# Patient Record
Sex: FEMALE | Race: WHITE | NOT HISPANIC OR LATINO | Employment: UNEMPLOYED | URBAN - METROPOLITAN AREA
[De-identification: names, ages, dates, MRNs, and addresses within clinical notes are randomized per-mention and may not be internally consistent; named-entity substitution may affect disease eponyms.]

---

## 2018-10-16 ENCOUNTER — CONVERSION ENCOUNTER (OUTPATIENT)
Dept: GENERAL RADIOLOGY | Facility: HOSPITAL | Age: 43
End: 2018-10-16

## 2019-09-22 ENCOUNTER — HOSPITAL ENCOUNTER (OUTPATIENT)
Dept: URGENT CARE | Facility: CLINIC | Age: 44
Discharge: HOME | End: 2019-09-22

## 2019-11-08 ENCOUNTER — HOSPITAL ENCOUNTER (OUTPATIENT)
Dept: SLEEP MEDICINE | Facility: HOSPITAL | Age: 44
Discharge: HOME | End: 2019-11-08
Attending: INTERNAL MEDICINE

## 2019-12-19 ENCOUNTER — HOSPITAL ENCOUNTER (OUTPATIENT)
Dept: SLEEP MEDICINE | Facility: HOSPITAL | Age: 44
Discharge: HOME | End: 2019-12-19
Attending: INTERNAL MEDICINE

## 2020-03-06 ENCOUNTER — HOSPITAL ENCOUNTER (OUTPATIENT)
Dept: SLEEP MEDICINE | Facility: HOSPITAL | Age: 45
Discharge: HOME | End: 2020-03-06
Attending: INTERNAL MEDICINE

## 2020-03-11 ENCOUNTER — OFFICE VISIT CONVERTED (OUTPATIENT)
Dept: NEUROSURGERY | Facility: CLINIC | Age: 45
End: 2020-03-11
Attending: NEUROLOGICAL SURGERY

## 2021-05-15 VITALS
DIASTOLIC BLOOD PRESSURE: 82 MMHG | SYSTOLIC BLOOD PRESSURE: 124 MMHG | BODY MASS INDEX: 33.95 KG/M2 | HEIGHT: 62 IN | WEIGHT: 184.5 LBS

## 2021-05-17 ENCOUNTER — HOSPITAL ENCOUNTER (OUTPATIENT)
Dept: URGENT CARE | Facility: CLINIC | Age: 46
Discharge: HOME | End: 2021-05-17
Attending: FAMILY MEDICINE

## 2021-07-30 RX ORDER — VARENICLINE TARTRATE 0.5 MG/1
TABLET, FILM COATED ORAL
COMMUNITY
End: 2021-11-03

## 2021-07-30 RX ORDER — SUMATRIPTAN 25 MG/1
TABLET, FILM COATED ORAL
COMMUNITY
End: 2021-11-03

## 2021-07-30 RX ORDER — IBUPROFEN 800 MG/1
TABLET ORAL
COMMUNITY
End: 2022-03-24

## 2021-08-02 ENCOUNTER — OFFICE VISIT (OUTPATIENT)
Dept: SURGERY | Facility: CLINIC | Age: 46
End: 2021-08-02

## 2021-08-02 VITALS — WEIGHT: 181.6 LBS | RESPIRATION RATE: 14 BRPM | HEIGHT: 62 IN | BODY MASS INDEX: 33.42 KG/M2

## 2021-08-02 DIAGNOSIS — D17.1 LIPOMA OF BACK: Primary | ICD-10-CM

## 2021-08-02 PROCEDURE — 99202 OFFICE O/P NEW SF 15 MIN: CPT | Performed by: SURGERY

## 2021-08-02 RX ORDER — CYCLOBENZAPRINE HCL 10 MG
10 TABLET ORAL 2 TIMES DAILY
COMMUNITY
Start: 2021-05-20 | End: 2022-03-24

## 2021-08-02 RX ORDER — TRAMADOL HYDROCHLORIDE 50 MG/1
TABLET ORAL
COMMUNITY
Start: 2021-07-27 | End: 2021-11-03

## 2021-08-02 RX ORDER — BENZONATATE 100 MG/1
CAPSULE ORAL
COMMUNITY
Start: 2021-06-18 | End: 2022-03-24

## 2021-08-02 RX ORDER — SUMATRIPTAN 100 MG/1
TABLET, FILM COATED ORAL
COMMUNITY
Start: 2021-07-06

## 2021-08-02 RX ORDER — FLUTICASONE PROPIONATE 50 MCG
1 SPRAY, SUSPENSION (ML) NASAL DAILY
COMMUNITY
Start: 2021-07-21 | End: 2022-03-24

## 2021-08-02 RX ORDER — DEXAMETHASONE 6 MG/1
6 TABLET ORAL DAILY
COMMUNITY
Start: 2021-05-17 | End: 2022-03-24

## 2021-08-02 RX ORDER — METHOCARBAMOL 750 MG/1
750 TABLET, FILM COATED ORAL EVERY 8 HOURS PRN
COMMUNITY
Start: 2021-07-26 | End: 2022-03-24

## 2021-08-02 RX ORDER — PROMETHAZINE HYDROCHLORIDE 25 MG/1
25 TABLET ORAL EVERY 6 HOURS PRN
COMMUNITY
Start: 2021-05-17 | End: 2022-10-26

## 2021-08-02 NOTE — PROGRESS NOTES
Inpatient History and Physical Surgical Orders    Preadmission Location:   Preadmission Time:  Facility:  Surgery Date:  Surgery Time:  Preadmission Test date:     Chief Complaint  Outpatient History and Physical / Surgical Orders    Primary Care Provider: Janet Cooley APRN    Referring Provider: Janet Cooley,*    Subjective      Patient Name: Davina Abarca : 1975    HPI  Davina came in today with a subcutaneous mass of the lower back.  Has a lot of trouble with chronic back pain and has known lordosis.  She recently saw her primary care provider who felt a small subcutaneous nodule in her right lower back.  She had an ultrasound done that showed no definite mass there.    Past History:  Medical History: has a past medical history of Leg pain, Lumbago, Migraines, and Thoracic back pain.   Surgical History: has a past surgical history that includes Tubal ligation.   Family History: family history includes Arthritis in her mother; Stroke in her father.   Social History: reports that she has been smoking. She has never used smokeless tobacco. She reports current alcohol use.  Allergies: Patient has no known allergies.       Current Outpatient Medications:   •  diclofenac (VOLTAREN) 50 MG EC tablet, TAKE 1 TABLET BY MOUTH EVERY 8 HOURS WITH FOOD OR MILK AS NEEDED, Disp: , Rfl:   •  fluticasone (FLONASE) 50 MCG/ACT nasal spray, 1 spray by Each Nare route Daily., Disp: , Rfl:   •  Melatonin 5 MG capsule, melatonin 5 mg oral capsule take 1 capsule by oral route As needed   Active, Disp: , Rfl:   •  methocarbamol (ROBAXIN) 750 MG tablet, Take 750 mg by mouth Every 8 (Eight) Hours As Needed., Disp: , Rfl:   •  SUMAtriptan (IMITREX) 100 MG tablet, TAKE 1 TABLET BY MOUTH 1 TIME DAILY AS NEEDED, Disp: , Rfl:   •  traMADol (ULTRAM) 50 MG tablet, TAKE 1 TABLET BY MOUTH EVERY 8 HOURS FOR 14 DAYS AS NEEDED, Disp: , Rfl:   •  benzonatate (TESSALON) 100 MG capsule, TAKE 1 CAPSULE BY MOUTH THREE TIMES  "DAILY FOR 10 DAYS AS NEEDED, Disp: , Rfl:   •  cyclobenzaprine (FLEXERIL) 10 MG tablet, Take 10 mg by mouth 2 (Two) Times a Day., Disp: , Rfl:   •  dexamethasone (DECADRON) 6 MG tablet, Take 6 mg by mouth Daily., Disp: , Rfl:   •  ibuprofen (ADVIL,MOTRIN) 800 MG tablet, ibuprofen 800 mg oral tablet take 1 tablet (800 mg) by oral route 3 times per day with food   Active, Disp: , Rfl:   •  promethazine (PHENERGAN) 25 MG tablet, Take 25 mg by mouth Every 6 (Six) Hours As Needed., Disp: , Rfl:   •  SUMAtriptan (IMITREX) 25 MG tablet, sumatriptan succinate 25 mg oral tablet take 1 tablet (25 mg) by oral route once with fluids as early as possible after the onset of a migraine attack;may repeat after 2 hours if headache returns, not to exceed 200mg in 24hrs   Active, Disp: , Rfl:   •  varenicline (Chantix) 0.5 MG tablet, Chantix 0.5 mg oral tablet take 1 tablet (0.5 mg) with glass of water by oral route 2 times per day after meals for 3 days   Active, Disp: , Rfl:        Objective   Vital Signs:   Resp 14   Ht 156.8 cm (61.75\")   Wt 82.4 kg (181 lb 9.6 oz)   BMI 33.48 kg/m²       Physical Exam  Vitals and nursing note reviewed.   Constitutional:       Appearance: Normal appearance.  She is well-developed.   Cardiovascular:      Rate and Rhythm: Normal rate and regular rhythm.   Pulmonary:      Effort: Pulmonary effort is normal.      Breath sounds: Normal air entry.   Abdominal:      General: Bowel sounds are normal.      Palpations: Abdomen is soft.      Skin:     General: Skin is warm and dry.   Neurological:      Mental Status: He is alert and oriented to person, place, and time.      Motor: Motor function is intact.   Psychiatric:         Mood and Affect: Mood normal.   Back: 2 cm subcutaneous lipoma of the right lower back noted    Result Review :               Assessment and Plan   Diagnoses and all orders for this visit:    1. Lipoma of back (Primary)    I have talked the matter over with Davina and at this " point I think it would be fine just to follow this lesion.  I do think that this is not large enough to really be producing a lot of pain and she was fine just following this expectantly.  I will see her back on an as-needed basis.    I  Heber Chand MD  08/02/2021

## 2021-08-04 ENCOUNTER — TRANSCRIBE ORDERS (OUTPATIENT)
Dept: ADMINISTRATIVE | Facility: HOSPITAL | Age: 46
End: 2021-08-04

## 2021-08-04 DIAGNOSIS — M50.90 DISC DISORDER OF CERVICAL REGION: Primary | ICD-10-CM

## 2021-08-25 ENCOUNTER — APPOINTMENT (OUTPATIENT)
Dept: NEUROLOGY | Facility: HOSPITAL | Age: 46
End: 2021-08-25

## 2021-09-14 ENCOUNTER — PROCEDURE VISIT (OUTPATIENT)
Dept: NEUROLOGY | Facility: CLINIC | Age: 46
End: 2021-09-14

## 2021-09-14 VITALS
SYSTOLIC BLOOD PRESSURE: 137 MMHG | DIASTOLIC BLOOD PRESSURE: 73 MMHG | BODY MASS INDEX: 33.68 KG/M2 | HEIGHT: 62 IN | WEIGHT: 183 LBS | HEART RATE: 63 BPM

## 2021-09-14 DIAGNOSIS — G56.01 CARPAL TUNNEL SYNDROME OF RIGHT WRIST: Primary | ICD-10-CM

## 2021-09-14 DIAGNOSIS — M50.90 DISC DISORDER OF CERVICAL REGION: ICD-10-CM

## 2021-09-14 PROBLEM — R20.0 NUMBNESS AND TINGLING: Status: ACTIVE | Noted: 2021-09-14

## 2021-09-14 PROBLEM — R20.2 NUMBNESS AND TINGLING: Status: ACTIVE | Noted: 2021-09-14

## 2021-09-14 PROCEDURE — 99202 OFFICE O/P NEW SF 15 MIN: CPT | Performed by: PSYCHIATRY & NEUROLOGY

## 2021-09-14 PROCEDURE — 95886 MUSC TEST DONE W/N TEST COMP: CPT | Performed by: PSYCHIATRY & NEUROLOGY

## 2021-09-14 PROCEDURE — 95909 NRV CNDJ TST 5-6 STUDIES: CPT | Performed by: PSYCHIATRY & NEUROLOGY

## 2021-09-14 NOTE — ASSESSMENT & PLAN NOTE
Nerve conduction study is abnormal and shows electrophysiologic evidence for mild right carpal tunnel syndrome.  I have given her a prescription to get wrist splints and she is to wear it for the next 4 weeks.  If there is improvement of her symptoms she can cancel neurosurgical appointment.  Discussed with her the wrist plants is a treatment for carpal tunnel syndrome.  If it is not effective she can go to orthopedic surgery to get steroid injections to her wrist.

## 2021-09-14 NOTE — ASSESSMENT & PLAN NOTE
EMG is normal and does not show electrophysiologic evidence for cervical radiculopathy involving the C5-T1 ventral nerve roots on the right side.  There is no evidence of denervation or reinnervation in the muscles tested.  Clinically I do not think she has a cervical radiculopathy.  She does have neuroforaminal narrowing at C5-C6 on the right on her previous MRI.  If there is no improvement of her right arm numbness and tingling symptomatology  she can follow-up with neurosurgery after she sees orthopedics.
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
## 2021-10-07 ENCOUNTER — OFFICE VISIT (OUTPATIENT)
Dept: NEUROSURGERY | Facility: CLINIC | Age: 46
End: 2021-10-07

## 2021-10-07 VITALS
BODY MASS INDEX: 33.16 KG/M2 | HEIGHT: 62 IN | SYSTOLIC BLOOD PRESSURE: 137 MMHG | WEIGHT: 180.2 LBS | DIASTOLIC BLOOD PRESSURE: 89 MMHG

## 2021-10-07 DIAGNOSIS — M54.12 CERVICAL RADICULOPATHY: ICD-10-CM

## 2021-10-07 DIAGNOSIS — M54.2 CERVICALGIA: Primary | ICD-10-CM

## 2021-10-07 DIAGNOSIS — G56.01 CARPAL TUNNEL SYNDROME OF RIGHT WRIST: ICD-10-CM

## 2021-10-07 PROCEDURE — 99203 OFFICE O/P NEW LOW 30 MIN: CPT | Performed by: NEUROLOGICAL SURGERY
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
## 2021-10-15 ENCOUNTER — HOSPITAL ENCOUNTER (OUTPATIENT)
Dept: MRI IMAGING | Facility: HOSPITAL | Age: 46
Discharge: HOME | End: 2021-10-15
Admitting: NEUROLOGICAL SURGERY

## 2021-10-15 DIAGNOSIS — M54.12 CERVICAL RADICULOPATHY: ICD-10-CM

## 2021-10-15 PROCEDURE — 72141 MRI NECK SPINE W/O DYE: CPT

## 2021-11-18 ENCOUNTER — OFFICE VISIT (OUTPATIENT)
Dept: NEUROSURGERY | Facility: CLINIC | Age: 46
End: 2021-11-18

## 2021-11-18 VITALS
SYSTOLIC BLOOD PRESSURE: 132 MMHG | DIASTOLIC BLOOD PRESSURE: 84 MMHG | WEIGHT: 181.3 LBS | HEIGHT: 62 IN | BODY MASS INDEX: 33.36 KG/M2

## 2021-11-18 DIAGNOSIS — M54.2 CERVICALGIA: Primary | ICD-10-CM

## 2021-11-18 DIAGNOSIS — G56.01 CARPAL TUNNEL SYNDROME OF RIGHT WRIST: ICD-10-CM

## 2021-11-18 PROCEDURE — 99213 OFFICE O/P EST LOW 20 MIN: CPT | Performed by: NEUROLOGICAL SURGERY

## 2021-11-18 NOTE — PROGRESS NOTES
Davina Abarca is a 46 y.o. female that presents with Neck Pain       She has continued numbness into the right greater than left. Her MRI shows multilevel bulges with no significant spinal stenosis. Her EMG/NCV shows mild right carpal tunnel syndrome.       Review of Systems   Musculoskeletal: Positive for myalgias, neck pain and neck stiffness.        Vitals:    11/18/21 1544   BP: 132/84        I personally reviewed the patient's MRI scan which shows mild multilevel degenerative changes. No significant canal or foraminal stenosis.        Assessment and Plan {CC Problem List  Visit Diagnosis  ROS  Review (Popup)  [facility] [place]  Quality  BestPractice  Medications  SmartSets  SnapShot Encounters  Media :23}   Problem List Items Addressed This Visit        Neuro    Carpal tunnel syndrome of right wrist      Other Visit Diagnoses     Cervicalgia    -  Primary      She will monitor for worsened pain into the arm, and specific dermatomes.     She might benefit from a cervical over the door traction device (10-12 lbs for 10-15 minutes).     We discussed the importance of smoking/nicotine cessation. Smoking/nicotine use has multiple health risks. In particular related to the spine, nicotine increases the incidence of lower back pain, speeds up the progression of degenerative disc disease and dramatically reduces healing after spine surgery (particularly a fusion operation).     Follow Up {Instructions Charge Capture  Follow-up Communications :23}   No follow-ups on file.

## 2022-01-04 ENCOUNTER — PREP FOR SURGERY (OUTPATIENT)
Dept: OTHER | Facility: HOSPITAL | Age: 47
End: 2022-01-04

## 2022-01-04 ENCOUNTER — CLINICAL SUPPORT (OUTPATIENT)
Dept: GASTROENTEROLOGY | Facility: CLINIC | Age: 47
End: 2022-01-04

## 2022-01-04 ENCOUNTER — TELEPHONE (OUTPATIENT)
Dept: GASTROENTEROLOGY | Facility: CLINIC | Age: 47
End: 2022-01-04

## 2022-01-04 DIAGNOSIS — Z12.11 COLON CANCER SCREENING: Primary | ICD-10-CM

## 2022-01-04 RX ORDER — ORPHENADRINE CITRATE 100 MG/1
TABLET, EXTENDED RELEASE ORAL
COMMUNITY
End: 2022-12-13

## 2022-01-04 NOTE — TELEPHONE ENCOUNTER
Davina Abarca  REASON FOR CALL encounter for colon screening  SENT IN PREP suprep  Past Medical History:   Diagnosis Date   • Leg pain    • Lumbago    • Migraines    • Thoracic back pain      No Known Allergies  Past Surgical History:   Procedure Laterality Date   • TUBAL ABDOMINAL LIGATION       Social History     Socioeconomic History   • Marital status:    Tobacco Use   • Smoking status: Current Every Day Smoker   • Smokeless tobacco: Never Used   Vaping Use   • Vaping Use: Never used   Substance and Sexual Activity   • Alcohol use: Yes     Comment: current some day occasionally drinks, less than 1 drink perday has been drinking for 6-10 years   • Drug use: Defer   • Sexual activity: Defer     Family History   Problem Relation Age of Onset   • Arthritis Mother    • Stroke Father        Current Outpatient Medications:   •  amoxicillin-clavulanate (AUGMENTIN) 875-125 MG per tablet, Take 1 tablet by mouth 2 (Two) Times a Day., Disp: 20 tablet, Rfl: 0  •  benzonatate (TESSALON) 100 MG capsule, TAKE 1 CAPSULE BY MOUTH THREE TIMES DAILY FOR 10 DAYS AS NEEDED, Disp: , Rfl:   •  cyclobenzaprine (FLEXERIL) 10 MG tablet, Take 10 mg by mouth 2 (Two) Times a Day., Disp: , Rfl:   •  dexamethasone (DECADRON) 6 MG tablet, Take 6 mg by mouth Daily., Disp: , Rfl:   •  diclofenac (VOLTAREN) 50 MG EC tablet, TAKE 1 TABLET BY MOUTH EVERY 8 HOURS WITH FOOD OR MILK AS NEEDED, Disp: , Rfl:   •  fluticasone (FLONASE) 50 MCG/ACT nasal spray, 1 spray by Each Nare route Daily., Disp: , Rfl:   •  ibuprofen (ADVIL,MOTRIN) 800 MG tablet, ibuprofen 800 mg oral tablet take 1 tablet (800 mg) by oral route 3 times per day with food   Active, Disp: , Rfl:   •  Melatonin 5 MG capsule, melatonin 5 mg oral capsule take 1 capsule by oral route As needed   Active, Disp: , Rfl:   •  methocarbamol (ROBAXIN) 750 MG tablet, Take 750 mg by mouth Every 8 (Eight) Hours As Needed., Disp: , Rfl:   •  orphenadrine (NORFLEX) 100 MG 12 hr tablet,  orphenadrine citrate  mg tablet,extended release  TAKE 1 TABLET BY MOUTH DAILY AT BEDTIME, Disp: , Rfl:   •  promethazine (PHENERGAN) 25 MG tablet, Take 25 mg by mouth Every 6 (Six) Hours As Needed., Disp: , Rfl:   •  SUMAtriptan (IMITREX) 100 MG tablet, TAKE 1 TABLET BY MOUTH 1 TIME DAILY AS NEEDED, Disp: , Rfl:

## 2022-01-04 NOTE — PROGRESS NOTES
SPOKE WITH PT ON A DATE FOR COLONOSCOPY OF  3/29/22. MADE SURE CHART WAS UP TO DATE. WENT OVER PREP AND MAILED OUT INSTRUCTIONS. PUT IN ORDER FOR COLON AND SENT PREP TO PHARMACY

## 2022-02-02 ENCOUNTER — TELEPHONE (OUTPATIENT)
Dept: PHYSICAL THERAPY | Facility: CLINIC | Age: 47
End: 2022-02-02

## 2022-03-22 ENCOUNTER — TELEPHONE (OUTPATIENT)
Dept: GASTROENTEROLOGY | Facility: CLINIC | Age: 47
End: 2022-03-22

## 2022-03-22 NOTE — TELEPHONE ENCOUNTER
Attempted to contact pt in regards to procedure reminder. Patient did not answer/ vm was full.   Mychart reminder also sent.

## 2022-03-29 NOTE — TELEPHONE ENCOUNTER
Patient r/s colonoscopy for 07/21/2022 with an arrival time of 0900. Patient aware of date and time.

## 2022-04-13 ENCOUNTER — TELEPHONE (OUTPATIENT)
Dept: NEUROLOGY | Facility: CLINIC | Age: 47
End: 2022-04-13

## 2022-04-13 NOTE — TELEPHONE ENCOUNTER
Caller: Davina Abarca    Relationship: Self    Best call back number: [phone]    What was the call regarding:   MEDICAL RECORDS BE FAXED TO HER   [facility] [facility]  DUARTE OR JACEY SAWANT WOULD HAVE BEEN THE ONES TO MAKE THE REQUEST.    GAVE PT [facility] PHONE AND FAX NUMBER    PT STATES REQUEST WAS MADE BACK IN FEB AND ON MAR 8, 2022 AND HER  STILL HASN'T RECEIVED THOSE.    CALLED [facility] TO GET INFO FOR PT. WARM TRANSFERRED PT TO [facility].

## 2022-04-26 ENCOUNTER — TELEPHONE (OUTPATIENT)
Dept: NEUROSURGERY | Facility: CLINIC | Age: 47
End: 2022-04-26
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
## 2022-07-07 ENCOUNTER — TELEPHONE (OUTPATIENT)
Dept: GASTROENTEROLOGY | Facility: CLINIC | Age: 47
End: 2022-07-07

## 2022-07-07 ENCOUNTER — OFFICE VISIT (OUTPATIENT)
Dept: OBSTETRICS AND GYNECOLOGY | Facility: CLINIC | Age: 47
End: 2022-07-07

## 2022-07-07 VITALS
HEART RATE: 83 BPM | WEIGHT: 154 LBS | BODY MASS INDEX: 28.17 KG/M2 | SYSTOLIC BLOOD PRESSURE: 147 MMHG | DIASTOLIC BLOOD PRESSURE: 80 MMHG

## 2022-07-07 DIAGNOSIS — Z01.419 ENCOUNTER FOR GYNECOLOGICAL EXAMINATION WITHOUT ABNORMAL FINDING: Primary | ICD-10-CM

## 2022-07-07 DIAGNOSIS — N93.0 BLEEDING AFTER INTERCOURSE: ICD-10-CM

## 2022-07-07 PROCEDURE — 99396 PREV VISIT EST AGE 40-64: CPT | Performed by: OBSTETRICS & GYNECOLOGY

## 2022-07-07 PROCEDURE — G0123 SCREEN CERV/VAG THIN LAYER: HCPCS | Performed by: OBSTETRICS & GYNECOLOGY

## 2022-07-07 PROCEDURE — 99212 OFFICE O/P EST SF 10 MIN: CPT | Performed by: OBSTETRICS & GYNECOLOGY

## 2022-07-07 NOTE — PROGRESS NOTES
Well Woman Visit    CC: Annual well woman exam       HPI:   46 y.o.No obstetric history on file. Contraception or HRT: Contraception:  Tubal ligation  Menses:   q mon, lasts 3-5 days, changes products q 4hrs on heaviest days.   Pain:  Moderate, not too bad  Incontinence concerns: No  Hx of abnormal pap:  No  Pt has concerns she would like to discuss. has bled during intercourse for years. Does not always happen and it is only spotting. Denies vaginal d/c, itching, or odor. No new sex partners. Some positions make it worse. Denies pain during or after intercourse.       History: PMHx, Meds, Allergies, PSHx, Social Hx, and POBHx all reviewed and updated.      PHYSICAL EXAM:  /80   Pulse 83   Wt 69.9 kg (154 lb)   LMP 06/16/2022 (Approximate)   BMI 28.17 kg/m²   General- NAD, alert and oriented, appropriate  Psych- Normal mood, good memory  Neck- No masses, no thyroid enlargement  CV- Regular rhythm, no murnurs  Resp- CTA to bases, no wheezes  Abdomen- Soft, non distended, non tender, no masses    Breast left-  Bilaterally symmetrical, no masses, non tender, no nipple discharge  Breast right- Bilaterally symmetrical, no masses, non tender, no nipple discharge    External genitalia- Normal female, no lesions  Urethra/meatus- Normal, no masses, non tender, no prolapse  Bladder- Normal, no masses, non tender, no prolapse  Vagina- Normal, no atrophy, no lesions, no discharge, no prolapse  Cvx- Normal, no lesions, no discharge, No cervical motion tenderness, scant bleeding noted with pap collection  Uterus- Normal size, shape & consistency.  Non tender, mobile, & no prolapse  Adnexa- No mass, non tender  Anus/Rectum/Perineum- Declined    Lymphatic- No palpable neck, axillary, or groin nodes  Ext- No edema, no cyanosis    Skin- No lesions, no rashes, no acanthosis nigricans        ASSESSMENT and PLAN:  WWE and Problem Visit    Diagnoses and all orders for this visit:    1. Encounter for gynecological examination  without abnormal finding (Primary)  -     Mammo Screening Digital Tomosynthesis Bilateral With CAD; Future  -     IGP,rfx Aptima HPV All Pth    2. Bleeding after intercourse    discussed that her exam looked good other than some scant bleeding with the cytobrush. Her bleeding is probably cervical and due to the cervix being mildly injured during intercourse. rto for any concerns.     Counseling:     Track menses, RTO IF <q21d, >7d long, or heavy    Domestic violence/abuse screen: negative    Depression screen: no SI    Preventative:   BREAST HEALTH- Monthly self breast exam importance and how to reviewed. MMG and/or MRI (prn) reviewed per society guidelines and her individual history. Mammo/MRI screen: Updated today.  CERVICAL CANCER Screening- Reviewed current ASCCP guidelines for screening w and wo cotest HPV, age specific.  Screen: Updated today.  COLON CANCER Screening- Reviewed current medical society guidelines and options.  Colonoscopy screen:  scheduled.  SEXUAL HEALTH: Declines STD screening.  VACCINATIONS Recommended: Flu annually.  Importance discussed, risk being unvaccinated reviewed.  Questions answered  SMOKING STATUS- pt does use nicotine and/or tobacco.  I reviewed importance of avoiding.  Options to quit offered per [employer] protocols.  Recommend FU w PCP regarding quitting options if unable to quit wo assistance.  Follow up PCP/Specialist PMHx and Labs      She understands the importance of having any ordered tests to be performed in a timely fashion.  She is encouraged to review her results online and/or contact or office if she has questions.     Follow Up:  Return if symptoms worsen or fail to improve.      Zoraida Valentin, APRN  07/07/2022

## 2022-07-11 LAB
CONV .: NORMAL
CYTOLOGIST CVX/VAG CYTO: NORMAL
CYTOLOGY CVX/VAG DOC CYTO: NORMAL
CYTOLOGY CVX/VAG DOC THIN PREP: NORMAL
DX ICD CODE: NORMAL
HIV 1 & 2 AB SER-IMP: NORMAL
OTHER STN SPEC: NORMAL
STAT OF ADQ CVX/VAG CYTO-IMP: NORMAL

## 2022-08-11 ENCOUNTER — HOSPITAL ENCOUNTER (OUTPATIENT)
Dept: MAMMOGRAPHY | Facility: HOSPITAL | Age: 47
Discharge: HOME | End: 2022-08-11
Admitting: OBSTETRICS & GYNECOLOGY

## 2022-08-11 DIAGNOSIS — Z01.419 ENCOUNTER FOR GYNECOLOGICAL EXAMINATION WITHOUT ABNORMAL FINDING: ICD-10-CM

## 2022-08-11 PROCEDURE — 77063 BREAST TOMOSYNTHESIS BI: CPT

## 2022-08-11 PROCEDURE — 77067 SCR MAMMO BI INCL CAD: CPT

## 2022-10-26 ENCOUNTER — OFFICE VISIT (OUTPATIENT)
Dept: OBSTETRICS AND GYNECOLOGY | Facility: CLINIC | Age: 47
End: 2022-10-26

## 2022-10-26 VITALS
BODY MASS INDEX: 28.71 KG/M2 | WEIGHT: 156 LBS | HEART RATE: 75 BPM | SYSTOLIC BLOOD PRESSURE: 138 MMHG | DIASTOLIC BLOOD PRESSURE: 88 MMHG | HEIGHT: 62 IN

## 2022-10-26 DIAGNOSIS — N93.0 POSTCOITAL BLEEDING: Primary | ICD-10-CM

## 2022-10-26 PROBLEM — M51.369 DEGENERATION OF LUMBAR INTERVERTEBRAL DISC: Status: ACTIVE | Noted: 2021-12-14

## 2022-10-26 PROBLEM — G43.909 MIGRAINES: Status: ACTIVE | Noted: 2022-10-26

## 2022-10-26 PROBLEM — M54.30 SCIATICA: Status: ACTIVE | Noted: 2022-05-16

## 2022-10-26 PROBLEM — M54.50 LOW BACK PAIN: Status: ACTIVE | Noted: 2021-12-14

## 2022-10-26 PROBLEM — M51.36 DEGENERATION OF LUMBAR INTERVERTEBRAL DISC: Status: ACTIVE | Noted: 2021-12-14

## 2022-10-26 PROBLEM — Z79.4 ENCOUNTER FOR LONG-TERM (CURRENT) USE OF INSULIN: Status: ACTIVE | Noted: 2022-03-31

## 2022-10-26 PROBLEM — M47.816 LUMBAR SPONDYLOSIS: Status: ACTIVE | Noted: 2022-03-04

## 2022-10-26 PROCEDURE — 99213 OFFICE O/P EST LOW 20 MIN: CPT | Performed by: OBSTETRICS & GYNECOLOGY

## 2022-10-26 RX ORDER — VARENICLINE TARTRATE 25 MG
KIT ORAL SEE ADMIN INSTRUCTIONS
COMMUNITY
Start: 2022-10-19

## 2022-10-26 RX ORDER — MELOXICAM 15 MG/1
15 TABLET ORAL DAILY
COMMUNITY
Start: 2022-08-26

## 2022-10-26 NOTE — PROGRESS NOTES
"GYN Visit    CC: Bleeding after intercourse    HPI:   47 y.o. who presents for bleeding after intercourse. Has had bleeding after intercourse since her twenties. Was very infrequent in the past. Has become very frequent over the last year or more.     History: PMHx, Meds, Allergies, PSHx, Social Hx, and POBHx all reviewed and updated.    /88   Pulse 75   Ht 157.5 cm (62\")   Wt 70.8 kg (156 lb)   Breastfeeding No   BMI 28.53 kg/m²     Physical Exam  Vitals and nursing note reviewed. Exam conducted with a chaperone present.   Constitutional:       General: She is not in acute distress.     Appearance: Normal appearance. She is not ill-appearing.   Abdominal:      General: Abdomen is flat. There is no distension.      Palpations: Abdomen is soft. There is no mass.      Tenderness: There is no abdominal tenderness. There is no guarding or rebound.      Hernia: No hernia is present. There is no hernia in the left inguinal area or right inguinal area.   Genitourinary:     General: Normal vulva.      Exam position: Lithotomy position.      Pubic Area: No rash.       Labia:         Right: No rash, tenderness, lesion or injury.         Left: No rash, tenderness, lesion or injury.       Urethra: No prolapse, urethral pain or urethral lesion.      Vagina: Normal.      Cervix: No friability, lesion, erythema or eversion.      Adnexa: Right adnexa normal and left adnexa normal.      Comments: The cervix is grossly normal.  There is no gross lesion.  The cervix does not appear erythematous or friable.  There is no bleeding with manipulation of the cervix.  There is a small amount of bleeding with insertion of a small sterile Q-tip into the endocervix.  Musculoskeletal:         General: No swelling.      Right lower leg: No edema.      Left lower leg: No edema.   Skin:     General: Skin is warm and dry.      Findings: No rash.   Neurological:      Mental Status: She is alert and oriented to person, place, and " time.   Psychiatric:         Mood and Affect: Mood normal.         Behavior: Behavior normal.         Thought Content: Thought content normal.         Judgment: Judgment normal.           ASSESSMENT AND PLAN:  Diagnoses and all orders for this visit:    1. Postcoital bleeding (Primary)  Assessment & Plan:  The patient's postcoital bleeding has been going on for many years.  It is worsened over the last year.  The patient has not had any significant evaluation up to this point.  Her Pap smear is normal.  She has no signs or symptoms of infection.  Infection would also be highly unlikely given the duration of her symptoms.  There is no obvious cervical polyp.  The cervix is not grossly friable.  I recommended a pelvic ultrasound to evaluate for any endocervical lesions or lesions within the lower uterine cavity that could result in this bleeding after intercourse.  If this is normal then I have discussed considering application of silver nitrate into the upper endocervical canal.  I suspect that there could be some ectopic endometrial tissue at the top of the endocervix.  The silver nitrate could help stabilize this tissue.  If this is ineffective then I would consider hysteroscopy D&C.  I think that is acceptable to proceed with regular intercourse at this time.  I did discuss with the patient that she would need to avoid intercourse between applications of the silver nitrate to allow healing.    I recommended daily multivitamin with folic acid    Orders:  -     US Pelvis Transvaginal Non OB; Future      Counseling: TRACK MENSES, RTO if <q21d, >7d long, heavy or painful.    SAFE SEX/condoms importance reviewed.        Follow Up:  Return in about 2 weeks (around 11/9/2022).      Ethan Loyola MD  10/26/2022

## 2022-10-27 PROBLEM — N93.0 POSTCOITAL BLEEDING: Status: ACTIVE | Noted: 2022-10-27

## 2022-10-27 NOTE — ASSESSMENT & PLAN NOTE
The patient's postcoital bleeding has been going on for many years.  It is worsened over the last year.  The patient has not had any significant evaluation up to this point.  Her Pap smear is normal.  She has no signs or symptoms of infection.  Infection would also be highly unlikely given the duration of her symptoms.  There is no obvious cervical polyp.  The cervix is not grossly friable.  I recommended a pelvic ultrasound to evaluate for any endocervical lesions or lesions within the lower uterine cavity that could result in this bleeding after intercourse.  If this is normal then I have discussed considering application of silver nitrate into the upper endocervical canal.  I suspect that there could be some ectopic endometrial tissue at the top of the endocervix.  The silver nitrate could help stabilize this tissue.  If this is ineffective then I would consider hysteroscopy D&C.  I think that is acceptable to proceed with regular intercourse at this time.  I did discuss with the patient that she would need to avoid intercourse between applications of the silver nitrate to allow healing.    I recommended daily multivitamin with folic acid

## 2022-11-14 ENCOUNTER — TELEPHONE (OUTPATIENT)
Dept: OBSTETRICS AND GYNECOLOGY | Facility: CLINIC | Age: 47
End: 2022-11-14

## 2022-12-13 ENCOUNTER — OFFICE VISIT (OUTPATIENT)
Dept: OBSTETRICS AND GYNECOLOGY | Facility: CLINIC | Age: 47
End: 2022-12-13

## 2022-12-13 VITALS
SYSTOLIC BLOOD PRESSURE: 140 MMHG | HEIGHT: 62 IN | BODY MASS INDEX: 30.73 KG/M2 | DIASTOLIC BLOOD PRESSURE: 76 MMHG | HEART RATE: 73 BPM | WEIGHT: 167 LBS

## 2022-12-13 DIAGNOSIS — N93.0 POSTCOITAL BLEEDING: Primary | ICD-10-CM

## 2022-12-13 PROCEDURE — 99213 OFFICE O/P EST LOW 20 MIN: CPT | Performed by: OBSTETRICS & GYNECOLOGY

## 2022-12-13 NOTE — PROGRESS NOTES
"GYN Visit    CC: Follow-up ultrasound    HPI:   47 y.o. who presents in follow-up with a pelvic ultrasound for postcoital bleeding.  The patient's postcoital bleeding has been a long-term chronic problem but has become more significant and persistent recently.  She has no new issues or concerns.    History: PMHx, Meds, Allergies, PSHx, Social Hx, and POBHx all reviewed and updated.    /76   Pulse 73   Ht 157.5 cm (62\")   Wt 75.8 kg (167 lb)   LMP 2022   Breastfeeding No   BMI 30.54 kg/m²     Physical Exam  Vitals and nursing note reviewed. Exam conducted with a chaperone present.   Constitutional:       General: She is not in acute distress.     Appearance: Normal appearance. She is not ill-appearing.   Genitourinary:     General: Normal vulva.      Exam position: Lithotomy position.      Pubic Area: No rash.       Labia:         Right: No rash, tenderness, lesion or injury.         Left: No rash, tenderness, lesion or injury.       Vagina: Normal.      Cervix: Friability present.      Comments: The cervix is friable and leads easily with any manipulation.  Silver nitrate was applied around the external cervical os and in the endocervical canal.  There was excellent hemostasis after the application of the silver nitrate  Musculoskeletal:      Right lower leg: No edema.      Left lower leg: No edema.   Skin:     Coloration: Skin is not jaundiced.      Findings: No lesion or rash.   Neurological:      Mental Status: She is alert.   Psychiatric:         Mood and Affect: Mood normal.         Behavior: Behavior normal.         Thought Content: Thought content normal.         Judgment: Judgment normal.       Pelvic ultrasound 2022 reviewed    ASSESSMENT AND PLAN:  Diagnoses and all orders for this visit:    1. Postcoital bleeding (Primary)  Assessment & Plan:  The patient has a normal Pap smear.  Her pelvic ultrasound is reassuring.  Several small fibroids are noted but I do not think these " are contributing to the patient's symptoms.  I suspect this is likely ectopic or unstable endometrial tissue near the endocervical canal or endocervical tissue.  Silver nitrate was applied all throughout the endocervical canal and right around the external os to try to stabilize this tissue.  The patient tolerated the procedure well without incident.  I recommend she use OTC Tylenol or her Mobic for any pelvic cramping.  I have asked her to refrain from intercourse for the next 2 weeks.  I will see her back at that time and plan for a second application of the silver nitrate.  At that point I will asked for pelvic rest for 1 more week and then have the patient see if the postcoital bleeding has resolved.      Follow Up:  Return in about 2 weeks (around 12/27/2022) for Recheck.    Ethan Loyola MD  12/13/2022

## 2022-12-14 PROBLEM — G43.009 MIGRAINE WITHOUT AURA, NOT REFRACTORY: Status: ACTIVE | Noted: 2022-12-14

## 2022-12-14 NOTE — ASSESSMENT & PLAN NOTE
The patient has a normal Pap smear.  Her pelvic ultrasound is reassuring.  Several small fibroids are noted but I do not think these are contributing to the patient's symptoms.  I suspect this is likely ectopic or unstable endometrial tissue near the endocervical canal or endocervical tissue.  Silver nitrate was applied all throughout the endocervical canal and right around the external os to try to stabilize this tissue.  The patient tolerated the procedure well without incident.  I recommend she use OTC Tylenol or her Mobic for any pelvic cramping.  I have asked her to refrain from intercourse for the next 2 weeks.  I will see her back at that time and plan for a second application of the silver nitrate.  At that point I will asked for pelvic rest for 1 more week and then have the patient see if the postcoital bleeding has resolved.

## 2022-12-27 ENCOUNTER — OFFICE VISIT (OUTPATIENT)
Dept: OBSTETRICS AND GYNECOLOGY | Facility: CLINIC | Age: 47
End: 2022-12-27

## 2022-12-27 VITALS
BODY MASS INDEX: 30.84 KG/M2 | SYSTOLIC BLOOD PRESSURE: 127 MMHG | HEART RATE: 63 BPM | DIASTOLIC BLOOD PRESSURE: 72 MMHG | WEIGHT: 168.6 LBS

## 2022-12-27 DIAGNOSIS — N93.0 POSTCOITAL BLEEDING: Primary | ICD-10-CM

## 2022-12-27 PROCEDURE — 99213 OFFICE O/P EST LOW 20 MIN: CPT | Performed by: OBSTETRICS & GYNECOLOGY

## 2022-12-27 NOTE — PROGRESS NOTES
GYN Visit    CC: Follow-up meds    HPI:   47 y.o. who presents in follow-up of a silver nitrate application to the endocervical canal for recurrent postcoital bleeding.  No problems after the application of silver nitrate.  She has not had intercourse.  She has had no unusual bleeding.  No other concerns      History: PMHx, Meds, Allergies, PSHx, Social Hx, and POBHx all reviewed and updated.    /72   Pulse 63   Wt 76.5 kg (168 lb 9.6 oz)   LMP 2022   Breastfeeding No   BMI 30.84 kg/m²     Physical Exam  Vitals and nursing note reviewed. Exam conducted with a chaperone present.   Constitutional:       General: She is not in acute distress.     Appearance: Normal appearance. She is not ill-appearing.   Genitourinary:     General: Normal vulva.      Pubic Area: No rash.       Labia:         Right: No rash, tenderness, lesion or injury.         Left: No rash, tenderness, lesion or injury.       Vagina: Normal.      Cervix: Normal.      Uterus: Normal.       Adnexa: Right adnexa normal and left adnexa normal.      Comments: Silver nitrate applied to the endocervical canal once again.  With the application this time there was no bleeding at all.  Musculoskeletal:         General: No swelling.      Right lower leg: No edema.      Left lower leg: No edema.   Skin:     General: Skin is warm and dry.      Findings: No rash.   Neurological:      Mental Status: She is alert and oriented to person, place, and time.   Psychiatric:         Mood and Affect: Mood normal.         Behavior: Behavior normal.         Thought Content: Thought content normal.         Judgment: Judgment normal.           ASSESSMENT AND PLAN:  Diagnoses and all orders for this visit:    1. Postcoital bleeding (Primary)  Assessment & Plan:  Now status post silver nitrate application #2.  No intercourse for the next 7 to 10 days.  After that I would like the patient to at attempt intercourse.  She will track to see if she has any  bleeding afterward.  If she has any cramping in response to the application of silver nitrate she can take Tylenol and or her meloxicam.          Follow Up:  Return in about 3 weeks (around 1/17/2023) for Recheck.    Ethan Loyola MD  12/27/2022

## 2022-12-27 NOTE — ASSESSMENT & PLAN NOTE
Now status post silver nitrate application #2.  No intercourse for the next 7 to 10 days.  After that I would like the patient to at attempt intercourse.  She will track to see if she has any bleeding afterward.  If she has any cramping in response to the application of silver nitrate she can take Tylenol and or her meloxicam.

## 2023-02-21 ENCOUNTER — TELEPHONE (OUTPATIENT)
Dept: OBSTETRICS AND GYNECOLOGY | Facility: CLINIC | Age: 48
End: 2023-02-21

## 2023-02-21 NOTE — TELEPHONE ENCOUNTER
Caller: Davina Abarca    Relationship to patient: Self    Best call back number: [phone]      Type of visit: GYN FOLLOW UP      If rescheduling, when is the original appointment: 2/21/23      Additional notes: PT CANCELLED TODAYS APPT DUE TO [place] OF [place]OWN AND SHE SAID HER ISSUES HAVE RESOLVED SHE DID NOT WANT TO R/S

## 2023-03-10 ENCOUNTER — TELEPHONE (OUTPATIENT)
Dept: SLEEP MEDICINE | Facility: HOSPITAL | Age: 48
End: 2023-03-10
Payer: COMMERCIAL

## 2023-03-10 ENCOUNTER — OFFICE VISIT (OUTPATIENT)
Dept: SLEEP MEDICINE | Facility: HOSPITAL | Age: 48
End: 2023-03-10
Payer: COMMERCIAL

## 2023-03-10 VITALS
HEIGHT: 62 IN | OXYGEN SATURATION: 100 % | DIASTOLIC BLOOD PRESSURE: 72 MMHG | WEIGHT: 176.1 LBS | SYSTOLIC BLOOD PRESSURE: 119 MMHG | HEART RATE: 72 BPM | BODY MASS INDEX: 32.41 KG/M2

## 2023-03-10 DIAGNOSIS — G47.33 OSA (OBSTRUCTIVE SLEEP APNEA): Primary | ICD-10-CM

## 2023-03-10 DIAGNOSIS — G47.33 OSA (OBSTRUCTIVE SLEEP APNEA): ICD-10-CM

## 2023-03-10 PROCEDURE — G0463 HOSPITAL OUTPT CLINIC VISIT: HCPCS

## 2023-03-10 RX ORDER — GABAPENTIN 400 MG/1
CAPSULE ORAL
COMMUNITY
Start: 2023-02-26

## 2023-03-10 RX ORDER — BUSPIRONE HYDROCHLORIDE 10 MG/1
1 TABLET ORAL AS NEEDED
COMMUNITY
Start: 2023-02-10

## 2023-03-10 RX ORDER — TIZANIDINE 4 MG/1
TABLET ORAL
COMMUNITY
Start: 2023-03-09

## 2023-03-10 RX ORDER — TRAMADOL HYDROCHLORIDE 50 MG/1
TABLET ORAL
COMMUNITY

## 2023-03-10 RX ORDER — BUPROPION HYDROCHLORIDE 150 MG/1
TABLET ORAL
COMMUNITY
Start: 2023-03-09

## 2023-03-10 RX ORDER — UBIDECARENONE 75 MG
50 CAPSULE ORAL DAILY
COMMUNITY

## 2023-03-10 NOTE — PROGRESS NOTES
[facility]      Patient Care Team:  Janet Cooley APRN as PCP - General (Nurse Practitioner)  Heber Chand MD as Consulting Physician (General Surgery)    Referring Provider: Janet Cooley,*    Chief complaint:   Re-establish care for sleep apnea    History of present illness:    Subjective   This is a 47-year-old female patient who is here today to reestablish care for sleep apnea.  She was last seen on 3/6/2020.      IMAN:    PSG on 12/19/19 showed AHI (4%)= 7 /hour and RDI 9/h.    She is on CPAP therapy but does not use it often.    She reported dry nose preventing her from using the machine sometimes but also stated that she falls asleep often without it and sometimes her  awakens her to put the machine on.    She has morning headache and dry mouth when she sleeps without her CPAP and she also feels tired during the day.    Sleep schedule:  -Bedtime: 10-12 Midnight  -Sleep latency: 0.5-1  -Wake up time: 8-9 , does not feel refreshed sometimes      Mask: Nasal which does fit well.  No significant air leak but dry nose  DME: [employer] [facility]    ESS: Total score: 5     Review of Systems  Constitutional: No fever or chills. No changes in appetite.   ENMT: Nasal congestion.  No postnasal drip, hoarsness  Cardiovascular: No chest pain, palpitation or legs swelling.    Respiratory: No dyspnea, cough or wheezing.  Gastrointestinal: No constipation, diarrhea, abdominal pain or acid reflux  Neurology: No headache, weakness, numbness or dizziness.   Musculoskeletal: No joints pain, stiffness or swelling.  Neck pain.  Psychiatry: No depression, anxiety or irritability.   Hem/Lymphatic: No swollen glands or easy bruising.  Integumentary: No rash.  Endocrinology: No excessive thirst, cold or warm intolerance.   Urinary: No dysuria, bloody urine or frequent urination.     History  Past Medical History:   Diagnosis Date   • Anxiety    •  Indication: Pain



3 views of the left knee were obtained.



Findings:  No acute fracture, malalignment, or joint effusion are identified. Joint

space is relatively well-maintained. Bone mineralization is within normal limits 

for

age.



Impression:  Negative exam Endometriosis    • Leg pain    • Lumbago    • Migraines    • Thoracic back pain    ,   Past Surgical History:   Procedure Laterality Date   • TONSILLECTOMY     • TUBAL ABDOMINAL LIGATION     ,   Family History   Problem Relation Age of Onset   • Stroke Father    • Skin cancer Father    • Arthritis Mother    • Malig Hyperthermia Neg Hx    ,   Social History     Socioeconomic History   • Marital status:    • Number of children: 3   Tobacco Use   • Smoking status: Former     Packs/day: 0.50     Types: Cigarettes   • Smokeless tobacco: Never   Vaping Use   • Vaping Use: Never used   Substance and Sexual Activity   • Alcohol use: Yes     Comment: current some day occasionally drinks, less than 1 drink perday has been drinking for 6-10 years   • Drug use: Never   • Sexual activity: Yes     Partners: Male     Birth control/protection: Tubal ligation     E-cigarette/Vaping   • E-cigarette/Vaping Use Never User    • Passive Exposure No    • Counseling Given No      E-cigarette/Vaping Substances   • Nicotine No    • THC No    • CBD No    • Flavoring No      E-cigarette/Vaping Devices   • Disposable No    • Pre-filled or Refillable Cartridge No    • Refillable Tank No    • Pre-filled Pod No         and Allergies:  Patient has no known allergies.    Medications:    Current Outpatient Medications:   •  buPROPion XL (WELLBUTRIN XL) 150 MG 24 hr tablet, , Disp: , Rfl:   •  busPIRone (BUSPAR) 10 MG tablet, Take 1 tablet by mouth As Needed., Disp: , Rfl:   •  gabapentin (NEURONTIN) 400 MG capsule, TAKE 1 CAPSULE BY MOUTH FOUR TIMES DAILY AS DIRECTED, Disp: , Rfl:   •  Melatonin 5 MG capsule, melatonin 5 mg oral capsule take 1 capsule by oral route As needed   Active, Disp: , Rfl:   •  meloxicam (MOBIC) 15 MG tablet, Take 1 tablet by mouth Daily., Disp: , Rfl:   •  NON FORMULARY, Apply  topically to the appropriate area as directed. Maloxicam, opiramate, gabapentin, lidocaine topical ointment per patient. PRN daily, Disp: ,  "Rfl:   •  SUMAtriptan (IMITREX) 100 MG tablet, TAKE 1 TABLET BY MOUTH 1 TIME DAILY AS NEEDED, Disp: , Rfl:   •  tiZANidine (ZANAFLEX) 4 MG tablet, , Disp: , Rfl:   •  traMADol (ULTRAM) 50 MG tablet, , Disp: , Rfl:   •  Varenicline Tartrate 0.5 MG X 11 & 1 MG X 42 tablet, See Admin Instructions. see package, Disp: , Rfl:   •  vitamin B-12 (CYANOCOBALAMIN) 100 MCG tablet, Take 50 mcg by mouth Daily., Disp: , Rfl:   •  gabapentin (NEURONTIN) 300 MG capsule, TAKE 1 CAPSULE BY MOUTH THREE TIMES DAILY AS DIRECTED, Disp: , Rfl:       Objective   Vital Signs:  Vitals:    03/10/23 1100   BP: 119/72   Pulse: 72   SpO2: 100%   Weight: 79.9 kg (176 lb 1.6 oz)   Height: 157.5 cm (62\")     Body mass index is 32.21 kg/m².  Neck Circumference: 12.5 inches     Physical Exam:  Neck Circumference: 12.5 inches     Constitutional: Not in acute distress.  Eyes: Injected conjunctiva, EOMI. pupils equal reactive to light.  ENMT: Estrada score 3. Mallampati score 3. No oral thrush. Tonsils grade 1.  Scalloped tongue.  Mild retrognathia.  Neck:  No thyromegaly.  Trachea midline.  Heart: Regular rhythm and rate, no murmur  Lungs: Good and equal air entry bilaterally. No crackles or wheezing.  Nonlabored breathing.       Abdomen: Obese.  Soft.  No tenderness.  Positive bowel sounds.  Extremities: No cyanosis, clubbing or pitting edema.  Warm extremities and well-perfused.  Neuro: Conscious, alert, oriented x3.  Gait is normal.  Strength 5/5 in arms and legs.  Psych: Appropriate mood and affect.    Integumentary: No rash.  Normal skin turgor.  Lymphatic: No palpable cervical or supraclavicular lymph nodes.    Diagnostic data:    CPAP download showed:  Date: Last 30 days  Usage (days): 46 %  Days used>4h: 36 %  AHI: 1.4/h  Leak: 0  Usage: 6h and 37 min  P 90% : 8  Auto CPAP: 5-15 cm H2O    Assessment   1. IMAN  2. Obesity, BMI 32      Plan:  Discussed the result of the download.  Patient is poorly compliant with therapy.  We discussed increasing " the humidity on her machine and if that does not help then we could fit her with a new mask.  We also discussed registering her CPAP with [facility]    Counseled for weight loss.  Encouraged to exercise regularly and cut down on carbohydrates.  Discussed that losing weight may decrease the severity of sleep apnea and obviate the need of CPAP therapy.            Flako Gray MD  03/10/23  11:39 EST    This note was dictated utilizing Dragon dictation

## 2023-08-14 ENCOUNTER — TRANSCRIBE ORDERS (OUTPATIENT)
Dept: ADMINISTRATIVE | Facility: HOSPITAL | Age: 48
End: 2023-08-14
Payer: COMMERCIAL

## 2023-08-14 DIAGNOSIS — Z12.31 VISIT FOR SCREENING MAMMOGRAM: Primary | ICD-10-CM

## 2023-08-18 ENCOUNTER — HOSPITAL ENCOUNTER (OUTPATIENT)
Dept: MAMMOGRAPHY | Facility: HOSPITAL | Age: 48
Discharge: HOME | End: 2023-08-18
Admitting: NURSE PRACTITIONER
Payer: COMMERCIAL

## 2023-08-18 DIAGNOSIS — Z12.31 VISIT FOR SCREENING MAMMOGRAM: ICD-10-CM

## 2023-08-18 PROCEDURE — 77067 SCR MAMMO BI INCL CAD: CPT

## 2023-08-18 PROCEDURE — 77063 BREAST TOMOSYNTHESIS BI: CPT

## 2024-03-04 ENCOUNTER — OFFICE VISIT (OUTPATIENT)
Dept: SURGERY | Facility: CLINIC | Age: 49
End: 2024-03-04
Payer: COMMERCIAL

## 2024-03-04 DIAGNOSIS — K64.1 GRADE II HEMORRHOIDS: Primary | ICD-10-CM

## 2024-03-04 PROCEDURE — 1159F MED LIST DOCD IN RCRD: CPT | Performed by: SURGERY

## 2024-03-04 PROCEDURE — 1160F RVW MEDS BY RX/DR IN RCRD: CPT | Performed by: SURGERY

## 2024-03-04 PROCEDURE — 99203 OFFICE O/P NEW LOW 30 MIN: CPT | Performed by: SURGERY

## 2024-03-04 RX ORDER — SODIUM CHLORIDE 0.9 % (FLUSH) 0.9 %
10 SYRINGE (ML) INJECTION AS NEEDED
OUTPATIENT
Start: 2024-03-04

## 2024-03-04 RX ORDER — BACLOFEN 10 MG/1
TABLET ORAL EVERY 8 HOURS SCHEDULED
COMMUNITY

## 2024-03-04 RX ORDER — SODIUM CHLORIDE, SODIUM LACTATE, POTASSIUM CHLORIDE, CALCIUM CHLORIDE 600; 310; 30; 20 MG/100ML; MG/100ML; MG/100ML; MG/100ML
70 INJECTION, SOLUTION INTRAVENOUS CONTINUOUS
OUTPATIENT
Start: 2024-03-04

## 2024-03-04 RX ORDER — ONDANSETRON 2 MG/ML
4 INJECTION INTRAMUSCULAR; INTRAVENOUS EVERY 6 HOURS PRN
OUTPATIENT
Start: 2024-03-04

## 2024-03-04 RX ORDER — SODIUM CHLORIDE 0.9 % (FLUSH) 0.9 %
10 SYRINGE (ML) INJECTION EVERY 12 HOURS SCHEDULED
OUTPATIENT
Start: 2024-03-04

## 2024-03-04 RX ORDER — BUSPIRONE HYDROCHLORIDE 10 MG/1
TABLET ORAL EVERY 12 HOURS SCHEDULED
COMMUNITY

## 2024-03-04 RX ORDER — AMITRIPTYLINE HYDROCHLORIDE 25 MG/1
1 TABLET, FILM COATED ORAL DAILY
COMMUNITY
Start: 2024-01-24

## 2024-03-04 RX ORDER — HYDROCORTISONE 25 MG/G
CREAM TOPICAL
COMMUNITY
Start: 2024-02-21

## 2024-03-04 RX ORDER — SODIUM CHLORIDE 9 MG/ML
40 INJECTION, SOLUTION INTRAVENOUS AS NEEDED
OUTPATIENT
Start: 2024-03-04

## 2024-03-04 NOTE — PROGRESS NOTES
Inpatient History and Physical Surgical Orders    Preadmission Location:   Preadmission Time:  Facility:  Surgery Date:  Surgery Time:  Preadmission Test date:     Chief Complaint  Outpatient History and Physical / Surgical Orders    Primary Care Provider: Janet Cooley APRN    Referring Provider: Janet Cooley,*    Subjective      Patient Name: Davina Abarca : 1975    HPI  The patient is a 48-year-old female that presents with symptomatic hemorrhoids.  She recently had a flare and was having quite a bit of discomfort.  She feels like her hemorrhoids are prolapsing with most bowel movements.    Past History:  Medical History: has a past medical history of Anxiety, Endometriosis, Leg pain, Lumbago, Migraines, and Thoracic back pain.   Surgical History: has a past surgical history that includes Tubal ligation and Tonsillectomy.   Family History: family history includes Arthritis in her mother; Skin cancer in her father; Stroke in her father.   Social History: reports that she has quit smoking. Her smoking use included cigarettes. She has never used smokeless tobacco. She reports current alcohol use. She reports that she does not use drugs.  Allergies: Patient has no known allergies.       Current Outpatient Medications:     amitriptyline (ELAVIL) 25 MG tablet, Take 1 tablet by mouth Daily., Disp: , Rfl:     baclofen (LIORESAL) 10 MG tablet, TAKE 1 TABLET BY MOUTH THREE TIMES DAILY WITH FOOD OR MILK, Disp: , Rfl:     baclofen (LIORESAL) 10 MG tablet, Every 8 (Eight) Hours., Disp: , Rfl:     buPROPion XL (WELLBUTRIN XL) 300 MG 24 hr tablet, Take 1 tablet by mouth Every Morning., Disp: , Rfl:     busPIRone (BUSPAR) 10 MG tablet, Every 12 (Twelve) Hours., Disp: , Rfl:     gabapentin (NEURONTIN) 400 MG capsule, TAKE 1 CAPSULE BY MOUTH FOUR TIMES DAILY AS DIRECTED, Disp: , Rfl:     Hydrocortisone, Perianal, (ANUSOL-HC) 2.5 % rectal cream, APPLY RECTALLY TO THE AFFECTED AREA TWICE DAILY, Disp: ,  Rfl:     Melatonin 5 MG capsule, melatonin 5 mg oral capsule take 1 capsule by oral route As needed   Active, Disp: , Rfl:     SUMAtriptan (IMITREX) 100 MG tablet, TAKE 1 TABLET BY MOUTH 1 TIME DAILY AS NEEDED, Disp: , Rfl:     traMADol (ULTRAM) 50 MG tablet, , Disp: , Rfl:     vitamin B-12 (CYANOCOBALAMIN) 100 MCG tablet, Take 0.5 tablets by mouth Daily., Disp: , Rfl:        Objective   Vital Signs:   There were no vitals taken for this visit.      Physical Exam  Vitals and nursing note reviewed.   Constitutional:       Appearance: Normal appearance. The patient is well-developed.   Cardiovascular:      Rate and Rhythm: Normal rate and regular rhythm.   Pulmonary:      Effort: Pulmonary effort is normal.      Breath sounds: Normal air entry.   Abdominal:      General: Bowel sounds are normal.      Palpations: Abdomen is soft.      Skin:     General: Skin is warm and dry.   Neurological:      Mental Status: The patient is alert and oriented to person, place, and time.      Motor: Motor function is intact.   Psychiatric:         Mood and Affect: Mood normal.   Rectal: Hemorrhoids noted    Result Review :               Assessment and Plan   Diagnoses and all orders for this visit:    1. Grade II hemorrhoids (Primary)  -     Case Request; Standing  -     Follow Anesthesia Guidelines / Protocol; Standing  -     Verify NPO Status; Standing  -     Obtain Informed Consent; Standing  -     Verify / Perform Chlorhexidine Skin Prep; Standing  -     Verify / Perform Chlorhexidine Skin Prep if Indicated (If Not Already Completed); Standing  -     Insert Peripheral IV; Standing  -     Saline Lock & Maintain IV Access; Standing  -     sodium chloride 0.9 % flush 10 mL  -     sodium chloride 0.9 % flush 10 mL  -     sodium chloride 0.9 % infusion 40 mL  -     lactated ringers infusion  -     ondansetron (ZOFRAN) injection 4 mg  -     ceFAZolin (ANCEF) 2,000 mg in sodium chloride 0.9 % 100 mL IVPB  -     Case Request    We will  schedule her for a rectal exam under anesthesia and hemorrhoid banding.  I described the procedure to her as well as the risk and benefits and she is agreeable to proceeding.    I  Heber Chand MD  03/04/2024

## 2024-05-22 ENCOUNTER — TELEPHONE (OUTPATIENT)
Dept: SURGERY | Facility: CLINIC | Age: 49
End: 2024-05-22
Payer: COMMERCIAL

## 2024-05-22 NOTE — TELEPHONE ENCOUNTER
Caller: Davina Abarca     Relationship: SELF     Best call back number: [phone]     What is the best time to reach you: ANYTIME         Do you  KIM   SURGERY SCHEDULING     Is it okay if the provider responds through [employer]:  PREFERS PHONE CALL

## 2024-06-07 ENCOUNTER — TELEPHONE (OUTPATIENT)
Dept: SURGERY | Facility: CLINIC | Age: 49
End: 2024-06-07
Payer: COMMERCIAL

## 2024-06-25 ENCOUNTER — TRANSCRIBE ORDERS (OUTPATIENT)
Dept: ADMINISTRATIVE | Facility: HOSPITAL | Age: 49
End: 2024-06-25
Payer: COMMERCIAL

## 2024-06-25 DIAGNOSIS — Z12.31 VISIT FOR SCREENING MAMMOGRAM: Primary | ICD-10-CM

## 2024-07-05 ENCOUNTER — TELEPHONE (OUTPATIENT)
Dept: SURGERY | Facility: CLINIC | Age: 49
End: 2024-07-05
Payer: COMMERCIAL

## 2024-07-05 NOTE — TELEPHONE ENCOUNTER
PAT spoke with patient this morning and the patient states that she wants to cancel her procedure with Dr. Chand on 7/10

## 2024-07-20 ENCOUNTER — HOSPITAL ENCOUNTER (EMERGENCY)
Facility: HOSPITAL | Age: 49
Discharge: HOME | End: 2024-07-20
Attending: EMERGENCY MEDICINE
Payer: OTHER GOVERNMENT

## 2024-07-20 ENCOUNTER — APPOINTMENT (OUTPATIENT)
Dept: CT IMAGING | Facility: HOSPITAL | Age: 49
End: 2024-07-20
Payer: OTHER GOVERNMENT

## 2024-07-20 VITALS
HEART RATE: 65 BPM | BODY MASS INDEX: 30.51 KG/M2 | DIASTOLIC BLOOD PRESSURE: 81 MMHG | TEMPERATURE: 98.6 F | OXYGEN SATURATION: 96 % | HEIGHT: 62 IN | WEIGHT: 165.79 LBS | RESPIRATION RATE: 18 BRPM | SYSTOLIC BLOOD PRESSURE: 132 MMHG

## 2024-07-20 DIAGNOSIS — K57.92 ACUTE DIVERTICULITIS: Primary | ICD-10-CM

## 2024-07-20 LAB
ALBUMIN SERPL-MCNC: 4.4 G/DL (ref 3.5–5.2)
ALBUMIN/GLOB SERPL: 1.9 G/DL
ALP SERPL-CCNC: 88 U/L (ref 39–117)
ALT SERPL W P-5'-P-CCNC: 14 U/L (ref 1–33)
ANION GAP SERPL CALCULATED.3IONS-SCNC: 10 MMOL/L (ref 5–15)
AST SERPL-CCNC: 14 U/L (ref 1–32)
BASOPHILS # BLD AUTO: 0.03 10*3/MM3 (ref 0–0.2)
BASOPHILS NFR BLD AUTO: 0.4 % (ref 0–1.5)
BILIRUB SERPL-MCNC: 0.7 MG/DL (ref 0–1.2)
BUN SERPL-MCNC: 17 MG/DL (ref 6–20)
BUN/CREAT SERPL: 21 (ref 7–25)
CALCIUM SPEC-SCNC: 9.2 MG/DL (ref 8.6–10.5)
CHLORIDE SERPL-SCNC: 103 MMOL/L (ref 98–107)
CO2 SERPL-SCNC: 24 MMOL/L (ref 22–29)
CREAT SERPL-MCNC: 0.81 MG/DL (ref 0.57–1)
DEPRECATED RDW RBC AUTO: 42.5 FL (ref 37–54)
EGFRCR SERPLBLD CKD-EPI 2021: 90.2 ML/MIN/1.73
EOSINOPHIL # BLD AUTO: 0.17 10*3/MM3 (ref 0–0.4)
EOSINOPHIL NFR BLD AUTO: 2.2 % (ref 0.3–6.2)
ERYTHROCYTE [DISTWIDTH] IN BLOOD BY AUTOMATED COUNT: 14.1 % (ref 12.3–15.4)
GLOBULIN UR ELPH-MCNC: 2.3 GM/DL
GLUCOSE SERPL-MCNC: 90 MG/DL (ref 65–99)
HCG INTACT+B SERPL-ACNC: <0.5 MIU/ML
HCT VFR BLD AUTO: 38.4 % (ref 34–46.6)
HGB BLD-MCNC: 12.7 G/DL (ref 12–15.9)
HOLD SPECIMEN: NORMAL
IMM GRANULOCYTES # BLD AUTO: 0.01 10*3/MM3 (ref 0–0.05)
IMM GRANULOCYTES NFR BLD AUTO: 0.1 % (ref 0–0.5)
LIPASE SERPL-CCNC: 43 U/L (ref 13–60)
LYMPHOCYTES # BLD AUTO: 2.19 10*3/MM3 (ref 0.7–3.1)
LYMPHOCYTES NFR BLD AUTO: 27.8 % (ref 19.6–45.3)
MCH RBC QN AUTO: 27.4 PG (ref 26.6–33)
MCHC RBC AUTO-ENTMCNC: 33.1 G/DL (ref 31.5–35.7)
MCV RBC AUTO: 82.8 FL (ref 79–97)
MONOCYTES # BLD AUTO: 0.61 10*3/MM3 (ref 0.1–0.9)
MONOCYTES NFR BLD AUTO: 7.8 % (ref 5–12)
NEUTROPHILS NFR BLD AUTO: 4.86 10*3/MM3 (ref 1.7–7)
NEUTROPHILS NFR BLD AUTO: 61.7 % (ref 42.7–76)
NRBC BLD AUTO-RTO: 0 /100 WBC (ref 0–0.2)
PLATELET # BLD AUTO: 247 10*3/MM3 (ref 140–450)
PMV BLD AUTO: 9.8 FL (ref 6–12)
POTASSIUM SERPL-SCNC: 3.8 MMOL/L (ref 3.5–5.2)
PROT SERPL-MCNC: 6.7 G/DL (ref 6–8.5)
RBC # BLD AUTO: 4.64 10*6/MM3 (ref 3.77–5.28)
SODIUM SERPL-SCNC: 137 MMOL/L (ref 136–145)
WBC NRBC COR # BLD AUTO: 7.87 10*3/MM3 (ref 3.4–10.8)
WHOLE BLOOD HOLD COAG: NORMAL
WHOLE BLOOD HOLD SPECIMEN: NORMAL

## 2024-07-20 PROCEDURE — 84702 CHORIONIC GONADOTROPIN TEST: CPT | Performed by: EMERGENCY MEDICINE

## 2024-07-20 PROCEDURE — 85025 COMPLETE CBC W/AUTO DIFF WBC: CPT | Performed by: EMERGENCY MEDICINE

## 2024-07-20 PROCEDURE — 83690 ASSAY OF LIPASE: CPT | Performed by: EMERGENCY MEDICINE

## 2024-07-20 PROCEDURE — 96374 THER/PROPH/DIAG INJ IV PUSH: CPT

## 2024-07-20 PROCEDURE — 25510000001 IOPAMIDOL PER 1 ML: Performed by: EMERGENCY MEDICINE

## 2024-07-20 PROCEDURE — 74177 CT ABD & PELVIS W/CONTRAST: CPT

## 2024-07-20 PROCEDURE — 25010000002 ONDANSETRON PER 1 MG: Performed by: EMERGENCY MEDICINE

## 2024-07-20 PROCEDURE — 25010000002 MORPHINE PER 10 MG: Performed by: EMERGENCY MEDICINE

## 2024-07-20 PROCEDURE — 80053 COMPREHEN METABOLIC PANEL: CPT | Performed by: EMERGENCY MEDICINE

## 2024-07-20 PROCEDURE — 25810000003 SODIUM CHLORIDE 0.9 % SOLUTION: Performed by: EMERGENCY MEDICINE

## 2024-07-20 PROCEDURE — 99285 EMERGENCY DEPT VISIT HI MDM: CPT

## 2024-07-20 PROCEDURE — 96375 TX/PRO/DX INJ NEW DRUG ADDON: CPT

## 2024-07-20 RX ORDER — ONDANSETRON 4 MG/1
4 TABLET, ORALLY DISINTEGRATING ORAL EVERY 8 HOURS PRN
Qty: 14 TABLET | Refills: 0 | Status: SHIPPED | OUTPATIENT
Start: 2024-07-20

## 2024-07-20 RX ORDER — OXYCODONE AND ACETAMINOPHEN 7.5; 325 MG/1; MG/1
1 TABLET ORAL EVERY 6 HOURS PRN
Qty: 12 TABLET | Refills: 0 | Status: SHIPPED | OUTPATIENT
Start: 2024-07-20

## 2024-07-20 RX ORDER — ONDANSETRON 2 MG/ML
4 INJECTION INTRAMUSCULAR; INTRAVENOUS ONCE
Status: COMPLETED | OUTPATIENT
Start: 2024-07-20 | End: 2024-07-20

## 2024-07-20 RX ORDER — METRONIDAZOLE 500 MG/1
500 TABLET ORAL 3 TIMES DAILY
Qty: 30 TABLET | Refills: 0 | Status: SHIPPED | OUTPATIENT
Start: 2024-07-20

## 2024-07-20 RX ORDER — SODIUM CHLORIDE 0.9 % (FLUSH) 0.9 %
10 SYRINGE (ML) INJECTION AS NEEDED
Status: DISCONTINUED | OUTPATIENT
Start: 2024-07-20 | End: 2024-07-20 | Stop reason: HOSPADM

## 2024-07-20 RX ORDER — CIPROFLOXACIN 500 MG/1
500 TABLET, FILM COATED ORAL EVERY 12 HOURS
Qty: 20 TABLET | Refills: 0 | Status: SHIPPED | OUTPATIENT
Start: 2024-07-20

## 2024-07-20 RX ADMIN — MORPHINE SULFATE 4 MG: 4 INJECTION, SOLUTION INTRAMUSCULAR; INTRAVENOUS at 14:15

## 2024-07-20 RX ADMIN — IOPAMIDOL 100 ML: 755 INJECTION, SOLUTION INTRAVENOUS at 15:24

## 2024-07-20 RX ADMIN — ONDANSETRON 4 MG: 2 INJECTION INTRAMUSCULAR; INTRAVENOUS at 14:15

## 2024-07-20 RX ADMIN — SODIUM CHLORIDE 1000 ML: 9 INJECTION, SOLUTION INTRAVENOUS at 14:16

## 2024-07-20 NOTE — ED PROVIDER NOTES
Time: 2:39 PM EDT  Date of encounter:  7/20/2024  Independent Historian/Clinical History and Information was obtained by:   Patient    History is limited by: N/A    Chief Complaint: Abdominal pain      History of Present Illness:  Patient is a 48 y.o. year old female who presents to the emergency department for evaluation of left lower quadrant abdominal pain.  Patient reports onset of lower abdominal pain but denies nausea, vomiting, diarrhea.    HPI    Patient Care Team  Primary Care Provider: Janet Cooley APRN    Past Medical History:     No Known Allergies  Past Medical History:   Diagnosis Date    Anxiety     Endometriosis     Leg pain     Lumbago     Migraines     Thoracic back pain      Past Surgical History:   Procedure Laterality Date    TONSILLECTOMY      TUBAL ABDOMINAL LIGATION       Family History   Problem Relation Age of Onset    Stroke Father     Skin cancer Father     Arthritis Mother     Malig Hyperthermia Neg Hx        Home Medications:  Prior to Admission medications    Medication Sig Start Date End Date Taking? Authorizing Provider   amitriptyline (ELAVIL) 25 MG tablet Take 1 tablet by mouth Daily. 1/24/24   Ezio Ordaz MD   baclofen (LIORESAL) 10 MG tablet TAKE 1 TABLET BY MOUTH THREE TIMES DAILY WITH FOOD OR MILK    Ezio Ordaz MD   baclofen (LIORESAL) 10 MG tablet Every 8 (Eight) Hours.    Ezio Ordaz MD   buPROPion XL (WELLBUTRIN XL) 300 MG 24 hr tablet Take 1 tablet by mouth Every Morning.    Ezio Ordaz MD   busPIRone (BUSPAR) 10 MG tablet Every 12 (Twelve) Hours.    Ezio Ordaz MD   gabapentin (NEURONTIN) 400 MG capsule TAKE 1 CAPSULE BY MOUTH FOUR TIMES DAILY AS DIRECTED 2/26/23   Ezio Ordaz MD   hydrocortisone 2.5 % cream APPLY RECTALLY TO THE AFFECTED AREA TWICE DAILY    Ezio Ordaz MD   Melatonin 5 MG capsule melatonin 5 mg oral capsule take 1 capsule by oral route As needed   Active    Provider  MD Ezio   pregabalin (LYRICA) 100 MG capsule Take 1 capsule by mouth 3 (Three) Times a Day.    Ezio Ordaz MD   SUMAtriptan (IMITREX) 100 MG tablet TAKE 1 TABLET BY MOUTH 1 TIME DAILY AS NEEDED 7/6/21   Ezio Ordaz MD   tiZANidine (ZANAFLEX) 4 MG tablet Every 12 (Twelve) Hours.    Ezio Ordaz MD   traMADol (ULTRAM) 50 MG tablet     Ezio Ordaz MD   vitamin B-12 (CYANOCOBALAMIN) 100 MCG tablet Take 0.5 tablets by mouth Daily.    Ezio Ordaz MD   Hydrocortisone, Perianal, (ANUSOL-HC) 2.5 % rectal cream APPLY RECTALLY TO THE AFFECTED AREA TWICE DAILY 2/21/24 7/20/24  Ezio Ordaz MD        Social History:   Social History     Tobacco Use    Smoking status: Former     Current packs/day: 0.50     Types: Cigarettes     Passive exposure: Current    Smokeless tobacco: Never   Vaping Use    Vaping status: Some Days   Substance Use Topics    Alcohol use: Yes     Comment: current some day occasionally drinks, less than 1 drink perday has been drinking for 6-10 years    Drug use: Never         Review of Systems:  Review of Systems   Constitutional:  Negative for chills and fever.   HENT:  Negative for congestion, rhinorrhea and sore throat.    Eyes:  Negative for pain and visual disturbance.   Respiratory:  Negative for apnea, cough, chest tightness and shortness of breath.    Cardiovascular:  Negative for chest pain and palpitations.   Gastrointestinal:  Positive for abdominal pain. Negative for diarrhea, nausea and vomiting.   Genitourinary:  Negative for difficulty urinating and dysuria.   Musculoskeletal:  Negative for joint swelling and myalgias.   Skin:  Negative for color change.   Neurological:  Negative for seizures and headaches.   Psychiatric/Behavioral: Negative.     All other systems reviewed and are negative.       Physical Exam:  /81 (BP Location: Left arm, Patient Position: Sitting)   Pulse 65   Temp 98.6 °F (37 °C) (Oral)   Resp 18   Ht  "157.5 cm (62\")   Wt 75.2 kg (165 lb 12.6 oz)   LMP 07/02/2024 (Approximate)   SpO2 96%   BMI 30.32 kg/m²     Physical Exam  Vitals and nursing note reviewed.   Constitutional:       General: She is not in acute distress.     Appearance: Normal appearance. She is not toxic-appearing.   HENT:      Head: Normocephalic and atraumatic.      Jaw: There is normal jaw occlusion.   Eyes:      General: Lids are normal.      Extraocular Movements: Extraocular movements intact.      Conjunctiva/sclera: Conjunctivae normal.      Pupils: Pupils are equal, round, and reactive to light.   Cardiovascular:      Rate and Rhythm: Normal rate and regular rhythm.      Pulses: Normal pulses.      Heart sounds: Normal heart sounds.   Pulmonary:      Effort: Pulmonary effort is normal. No respiratory distress.      Breath sounds: Normal breath sounds. No wheezing or rhonchi.   Abdominal:      General: Abdomen is flat.      Palpations: Abdomen is soft.      Tenderness: There is abdominal tenderness in the left lower quadrant. There is guarding. There is no rebound.   Musculoskeletal:         General: Normal range of motion.      Cervical back: Normal range of motion and neck supple.      Right lower leg: No edema.      Left lower leg: No edema.   Skin:     General: Skin is warm and dry.   Neurological:      Mental Status: She is alert and oriented to person, place, and time. Mental status is at baseline.   Psychiatric:         Mood and Affect: Mood normal.                  Procedures:  Procedures      Medical Decision Making:      Comorbidities that affect care:    Sciatica, migraines    External Notes reviewed:    Previous Clinic Note: Urgent care office visit today patient was referred to the emergency department for further evaluation      The following orders were placed and all results were independently analyzed by me:  Orders Placed This Encounter   Procedures    CT Abdomen Pelvis With Contrast    [place] Draw    Comprehensive " Metabolic Panel    Lipase    hCG, Quantitative, Pregnancy    CBC Auto Differential    NPO Diet NPO Type: Strict NPO    Undress & Gown    Insert Peripheral IV    CBC & Differential    Green Top (Gel)    Lavender Top    Gold Top - SST    Light Blue Top    Extra Tubes    Gray Top       Medications Given in the Emergency Department:  Medications   sodium chloride 0.9 % flush 10 mL (has no administration in time range)   morphine injection 4 mg (4 mg Intravenous Given 7/20/24 1415)   ondansetron (ZOFRAN) injection 4 mg (4 mg Intravenous Given 7/20/24 1415)   sodium chloride 0.9 % bolus 1,000 mL (1,000 mL Intravenous New Bag 7/20/24 1416)   iopamidol (ISOVUE-370) 76 % injection 100 mL (100 mL Intravenous Given 7/20/24 1524)        ED Course:         Labs:    Lab Results (last 24 hours)       Procedure Component Value Units Date/Time    POC Urinalysis Dipstick, Multipro (All Except Ang UCs) [[id number]]  (Abnormal) Collected: 07/20/24 1235    Specimen: Urine Updated: 07/20/24 1236     Color Yellow     Clarity, UA Clear     Glucose, UA Negative mg/dL      Bilirubin Negative     Ketones, UA Negative     Specific Gravity  1.030     Blood, UA Trace     pH, Urine 6.0     Protein, POC Negative mg/dL      Urobilinogen, UA Normal     Nitrite, UA Negative     Leukocytes Negative    CBC & Differential [[id number]]  (Normal) Collected: 07/20/24 1342    Specimen: Blood Updated: 07/20/24 1355    Narrative:      The following orders were created for panel order CBC & Differential.  Procedure                               Abnormality         Status                     ---------                               -----------         ------                     CBC Auto Differential[[id number]]        Normal              Final result                 Please view results for these tests on the individual orders.    Comprehensive Metabolic Panel [[id number]] Collected: 07/20/24 1342    Specimen: Blood Updated: 07/20/24 1415     Glucose 90 mg/dL       BUN 17 mg/dL      Creatinine 0.81 mg/dL      Sodium 137 mmol/L      Potassium 3.8 mmol/L      Chloride 103 mmol/L      CO2 24.0 mmol/L      Calcium 9.2 mg/dL      Total Protein 6.7 g/dL      Albumin 4.4 g/dL      ALT (SGPT) 14 U/L      AST (SGOT) 14 U/L      Alkaline Phosphatase 88 U/L      Total Bilirubin 0.7 mg/dL      Globulin 2.3 gm/dL      A/G Ratio 1.9 g/dL      BUN/Creatinine Ratio 21.0     Anion Gap 10.0 mmol/L      eGFR 90.2 mL/min/1.73     Narrative:      GFR Normal >60  Chronic Kidney Disease <60  Kidney Failure <15      Lipase [[id number]]  (Normal) Collected: 07/20/24 1342    Specimen: Blood Updated: 07/20/24 1415     Lipase 43 U/L     hCG, Quantitative, Pregnancy [[id number]] Collected: 07/20/24 1342    Specimen: Blood Updated: 07/20/24 1411     HCG Quantitative <0.50 mIU/mL     Narrative:      HCG Ranges by Gestational Age    Females - non-pregnant premenopausal   </= 1mIU/mL HCG  Females - postmenopausal               </= 7mIU/mL HCG    3 Weeks       5.4   -      72 mIU/mL  4 Weeks      10.2   -     708 mIU/mL  5 Weeks       217   -   8,245 mIU/mL  6 Weeks       152   -  32,177 mIU/mL  7 Weeks     4,059   - 153,767 mIU/mL  8 Weeks    31,366   - 149,094 mIU/mL  9 Weeks    59,109   - 135,901 mIU/mL  10 Weeks   44,186   - 170,409 mIU/mL  12 Weeks   27,107   - 201,615 mIU/mL  14 Weeks   24,302   -  93,646 mIU/mL  15 Weeks   12,540   -  69,747 mIU/mL  16 Weeks    8,904   -  55,332 mIU/mL  17 Weeks    8,240   -  51,793 mIU/mL  18 Weeks    9,649   -  55,271 mIU/mL      CBC Auto Differential [[id number]]  (Normal) Collected: 07/20/24 1342    Specimen: Blood Updated: 07/20/24 1355     WBC 7.87 10*3/mm3      RBC 4.64 10*6/mm3      Hemoglobin 12.7 g/dL      Hematocrit 38.4 %      MCV 82.8 fL      MCH 27.4 pg      MCHC 33.1 g/dL      RDW 14.1 %      RDW-SD 42.5 fl      MPV 9.8 fL      Platelets 247 10*3/mm3      Neutrophil % 61.7 %      Lymphocyte % 27.8 %      Monocyte % 7.8 %      Eosinophil % 2.2 %      Basophil  % 0.4 %      Immature Grans % 0.1 %      Neutrophils, Absolute 4.86 10*3/mm3      Lymphocytes, Absolute 2.19 10*3/mm3      Monocytes, Absolute 0.61 10*3/mm3      Eosinophils, Absolute 0.17 10*3/mm3      Basophils, Absolute 0.03 10*3/mm3      Immature Grans, Absolute 0.01 10*3/mm3      nRBC 0.0 /100 WBC              Imaging:    CT Abdomen Pelvis With Contrast    Result Date: 7/20/2024  CT ABDOMEN PELVIS W CONTRAST Date of Exam: 7/20/2024 3:00 PM EDT Indication: llq pain Abdominal pain. Comparison: None available. Technique: Axial CT images were obtained of the abdomen and pelvis after the uneventful intravenous administration of iodinated contrast. Reconstructed coronal and sagittal images were also obtained. Automated exposure control and iterative construction methods were used. Findings: ABDOMEN: The lung bases are clear. The liver, spleen, pancreas and adrenal glands are normal. The left kidney is normal. There is a 2 mm nonobstructing calcification in the lower pole of the right kidney. There are no inflammatory changes around the gallbladder. PELVIS: Colonic diverticulosis is present. There are inflammatory changes around the distal descending colon consistent with acute diverticulitis. No evidence of bowel obstruction, perforation or abscess. The uterus and adnexa have a normal CT appearance. The appendix and terminal ileum are normal. The urinary bladder is normal. The abdominal aorta has a normal caliber. There are uterine leiomyomas. Degenerative changes are present in the lumbar spine.     Impression: Acute diverticulitis of the distal descending colon. Electronically Signed: Jose Frausto MD  7/20/2024 3:33 PM EDT  Workstation ID: [id number]       Differential Diagnosis and Discussion:    Abdominal Pain: Based on the patient's signs and symptoms, I considered abdominal aortic aneurysm, small bowel obstruction, pancreatitis, acute cholecystitis, acute appendecitis, peptic ulcer disease, gastritis, colitis,  endocrine disorders, irritable bowel syndrome and other differential diagnosis an etiology of the patient's abdominal pain.    All labs were reviewed and interpreted by me.  CT scan radiology impression was interpreted by me.    MDM  Number of Diagnoses or Management Options  Acute diverticulitis  Diagnosis management comments: In summary this is a 48-year-old female who presents to the emergency department for evaluation of left lower quadrant abdominal pain.  CBC independently reviewed and interpreted by me and shows no critical abnormalities.  CMP independently reviewed and interpreted by me and shows no critical abnormalities.  CT scan abdomen pelvis reveals acute diverticulitis without abscess or perforation based on my review.  Patient will be discharged home with prescriptions for dual antibiotic coverage, pain medication and antiemetics.  Very strict return to ER and follow-up instructions have been provided to the patient.                   Patient Care Considerations:    SEPSIS was considered but is NOT present in the emergency department as SIRS criteria is not present. CONSULT: I considered consulting surgery, however no emergent indication, no abscess or perforation identified      Consultants/Shared Management Plan:    None    Social Determinants of Health:    Patient is independent, reliable, and has access to care.       Disposition and Care Coordination:    Discharged: The patient is suitable and stable for discharge with no need for consideration of admission.    I have explained the patient´s condition, diagnoses and treatment plan based on the information available to me at this time. I have answered questions and addressed any concerns. The patient has a good  understanding of the patient´s diagnosis, condition, and treatment plan as can be expected at this point. The vital signs have been stable. The patient´s condition is stable and appropriate for discharge from the emergency department.       The patient will pursue further outpatient evaluation with the primary care physician or other designated or consulting physician as outlined in the discharge instructions. They are agreeable to this plan of care and follow-up instructions have been explained in detail. The patient has received these instructions in written format and has expressed an understanding of the discharge instructions. The patient is aware that any significant change in condition or worsening of symptoms should prompt an immediate return to this or the closest emergency department or call to 911.  I have explained discharge medications and the need for follow up with the patient/caretakers. This was also printed in the discharge instructions. Patient was discharged with the following medications and follow up:      Medication List        New Prescriptions      ciprofloxacin 500 MG tablet  Commonly known as: CIPRO  Take 1 tablet by mouth Every 12 (Twelve) Hours.     metroNIDAZOLE 500 MG tablet  Commonly known as: FLAGYL  Take 1 tablet by mouth 3 (Three) Times a Day.     ondansetron ODT 4 MG disintegrating tablet  Commonly known as: ZOFRAN-ODT  Place 1 tablet on the tongue Every 8 (Eight) Hours As Needed for Nausea or Vomiting.     oxyCODONE-acetaminophen 7.5-325 MG per tablet  Commonly known as: PERCOCET  Take 1 tablet by mouth Every 6 (Six) Hours As Needed for Severe Pain.               Where to Get Your Medications        These medications were sent to [id number] - [place], [place] - [address] AT [facility] - [phone]  - [phone] FX  [address] [place] [address]      Phone: [phone]   ciprofloxacin 500 MG tablet  metroNIDAZOLE 500 MG tablet  ondansetron ODT 4 MG disintegrating tablet  oxyCODONE-acetaminophen 7.5-325 MG per tablet      Janet Cooley, APRN  [address] [facility] [place] [address] [phone]    In 1 week         Final diagnoses:   Acute  diverticulitis        ED Disposition       ED Disposition   Discharge    Condition   Stable    Comment   --               This medical record created using voice recognition software.             Deniz Springer MD  07/20/24 0703

## 2025-01-13 ENCOUNTER — OFFICE VISIT (OUTPATIENT)
Dept: SURGERY | Facility: CLINIC | Age: 50
End: 2025-01-13
Payer: COMMERCIAL

## 2025-01-13 VITALS — RESPIRATION RATE: 16 BRPM | WEIGHT: 170 LBS | BODY MASS INDEX: 31.28 KG/M2 | HEIGHT: 62 IN

## 2025-01-13 DIAGNOSIS — K64.1 GRADE II HEMORRHOIDS: Primary | ICD-10-CM

## 2025-01-13 PROCEDURE — 99213 OFFICE O/P EST LOW 20 MIN: CPT | Performed by: SURGERY

## 2025-01-13 PROCEDURE — 1159F MED LIST DOCD IN RCRD: CPT | Performed by: SURGERY

## 2025-01-13 PROCEDURE — 1160F RVW MEDS BY RX/DR IN RCRD: CPT | Performed by: SURGERY

## 2025-01-13 RX ORDER — SODIUM CHLORIDE 9 MG/ML
40 INJECTION, SOLUTION INTRAVENOUS AS NEEDED
OUTPATIENT
Start: 2025-01-13

## 2025-01-13 RX ORDER — SODIUM CHLORIDE 0.9 % (FLUSH) 0.9 %
10 SYRINGE (ML) INJECTION AS NEEDED
OUTPATIENT
Start: 2025-01-13

## 2025-01-13 RX ORDER — ONDANSETRON 2 MG/ML
4 INJECTION INTRAMUSCULAR; INTRAVENOUS EVERY 6 HOURS PRN
OUTPATIENT
Start: 2025-01-13

## 2025-01-13 RX ORDER — SODIUM CHLORIDE 0.9 % (FLUSH) 0.9 %
10 SYRINGE (ML) INJECTION EVERY 12 HOURS SCHEDULED
OUTPATIENT
Start: 2025-01-13

## 2025-01-13 NOTE — H&P (VIEW-ONLY)
Inpatient History and Physical Surgical Orders    Preadmission Location:   Preadmission Time:  Facility:  Surgery Date:  Surgery Time:  Preadmission Test date:     Chief Complaint  Outpatient History and Physical / Surgical Orders    Primary Care Provider: Janet Cooley APRN    Referring Provider: No ref. provider found    Subjective      Patient Name: Davina Abarca : 1975    HPI  The patient is a 49-year-old female that we had originally met last spring.  She has symptomatic hemorrhoids with complaints of bleeding and prolapse following bowel movements.  We had originally talked about getting set up for a hemorrhoid banding but she had some things come up and was not able to get that completed.  She came back today and wanted to go ahead and get scheduled for that procedure.    Past History:  Medical History: has a past medical history of Anxiety, Endometriosis, Hemorrhoids, Leg pain, Lumbago, Migraines, and Thoracic back pain.   Surgical History: has a past surgical history that includes Tubal ligation and Tonsillectomy.   Family History: family history includes Arthritis in her mother; Skin cancer in her father; Stroke in her father.   Social History: reports that she has quit smoking. Her smoking use included cigarettes. She has been exposed to tobacco smoke. She has never used smokeless tobacco. She reports current alcohol use. She reports that she does not use drugs.  Allergies: Patient has no known allergies.       Current Outpatient Medications:     buPROPion XL (WELLBUTRIN XL) 300 MG 24 hr tablet, Take 1 tablet by mouth Every Morning., Disp: , Rfl:     hydrocortisone 2.5 % cream, APPLY RECTALLY TO THE AFFECTED AREA TWICE DAILY, Disp: , Rfl:     Melatonin 5 MG capsule, melatonin 5 mg oral capsule take 1 capsule by oral route As needed   Active, Disp: , Rfl:     ondansetron ODT (ZOFRAN-ODT) 4 MG disintegrating tablet, Place 1 tablet on the tongue Every 8 (Eight) Hours As Needed for Nausea  "or Vomiting., Disp: 14 tablet, Rfl: 0    pregabalin (LYRICA) 100 MG capsule, Take 1 capsule by mouth 3 (Three) Times a Day., Disp: , Rfl:     SUMAtriptan (IMITREX) 100 MG tablet, TAKE 1 TABLET BY MOUTH 1 TIME DAILY AS NEEDED, Disp: , Rfl:     tiZANidine (ZANAFLEX) 4 MG tablet, Every 12 (Twelve) Hours., Disp: , Rfl:     traMADol (ULTRAM) 50 MG tablet, , Disp: , Rfl:     vitamin B-12 (CYANOCOBALAMIN) 100 MCG tablet, Take 0.5 tablets by mouth Daily., Disp: , Rfl:     amitriptyline (ELAVIL) 25 MG tablet, Take 1 tablet by mouth Daily., Disp: , Rfl:     B Complex Vitamins (VITAMIN B COMPLEX 100 IJ), , Disp: , Rfl:     baclofen (LIORESAL) 10 MG tablet, TAKE 1 TABLET BY MOUTH THREE TIMES DAILY WITH FOOD OR MILK, Disp: , Rfl:     baclofen (LIORESAL) 10 MG tablet, Every 8 (Eight) Hours., Disp: , Rfl:     busPIRone (BUSPAR) 10 MG tablet, Every 12 (Twelve) Hours., Disp: , Rfl:     ciprofloxacin (CIPRO) 500 MG tablet, Take 1 tablet by mouth Every 12 (Twelve) Hours., Disp: 20 tablet, Rfl: 0    gabapentin (NEURONTIN) 400 MG capsule, TAKE 1 CAPSULE BY MOUTH FOUR TIMES DAILY AS DIRECTED, Disp: , Rfl:     metroNIDAZOLE (FLAGYL) 500 MG tablet, Take 1 tablet by mouth 3 (Three) Times a Day., Disp: 30 tablet, Rfl: 0    oxyCODONE-acetaminophen (PERCOCET) 7.5-325 MG per tablet, Take 1 tablet by mouth Every 6 (Six) Hours As Needed for Severe Pain., Disp: 12 tablet, Rfl: 0       Objective   Vital Signs:   Resp 16   Ht 157.5 cm (62.01\")   Wt 77.1 kg (170 lb)   BMI 31.09 kg/m²       Physical Exam  Vitals and nursing note reviewed.   Constitutional:       Appearance: Normal appearance. The patient is well-developed.   Cardiovascular:      Rate and Rhythm: Normal rate and regular rhythm.   Pulmonary:      Effort: Pulmonary effort is normal.      Breath sounds: Normal air entry.   Abdominal:      General: Bowel sounds are normal.      Palpations: Abdomen is soft.      Skin:     General: Skin is warm and dry.   Neurological:      Mental Status: " The patient is alert and oriented to person, place, and time.      Motor: Motor function is intact.   Psychiatric:         Mood and Affect: Mood normal.       Result Review :               Assessment and Plan   Diagnoses and all orders for this visit:    1. Grade II hemorrhoids (Primary)  -     Case Request; Standing  -     Follow Anesthesia Guidelines / Protocol; Future  -     Follow Anesthesia Guidelines / Protocol; Standing  -     Verify NPO Status; Standing  -     Verify / Perform Chlorhexidine Skin Prep; Standing  -     Insert Peripheral IV; Standing  -     Saline Lock & Maintain IV Access; Standing  -     sodium chloride 0.9 % flush 10 mL  -     sodium chloride 0.9 % flush 10 mL  -     sodium chloride 0.9 % infusion 40 mL  -     Place Sequential Compression Device; Standing  -     Maintain Sequential Compression Device; Standing  -     ondansetron (ZOFRAN) injection 4 mg  -     ceFAZolin (ANCEF) 2,000 mg in sodium chloride 0.9 % 100 mL IVPB  -     Case Request    We will schedule her for a rectal exam under anesthesia and a hemorrhoid banding.  I have described the procedure to her as well as the risk and benefits and she is agreeable to proceeding.    I  Heber Chand MD  01/13/2025

## 2025-01-16 RX ORDER — BUSPIRONE HYDROCHLORIDE 10 MG/1
10 TABLET ORAL 2 TIMES DAILY
COMMUNITY

## 2025-01-16 NOTE — PRE-PROCEDURE INSTRUCTIONS
ATIENT INSTRUCTED TO BE:    - NOTHING TO EAT AFTER MIDNIGHT OR CHEW, EXCEPT CAN HAVE CLEAR LIQUIDS 2 HOURS PRIOR TO SURGERY ARRIVAL TIME , NO MORE THAN 8 OZ. (NOTHING RED)     - TO HOLD ALL VITAMINS, SUPPLEMENTS, NSAIDS FOR ONE WEEK PRIOR TO THEIR SURGICAL PROCEDURE    - DO NOT TAKE ______________________ 7 DAYS PRIOR TO PROCEDURE PER ANESTHESIA RECOMMENDATIONS/INSTRUCTIONS     - INSTRUCTED PT TO USE SURGICAL SOAP 1 TIME THE NIGHT PRIOR TO SURGERY ___________ OR THE AM OF SURGERY _____________   USE THE SOAP FROM NECK TO TOES, AVOID THEIR FACE, HAIR, AND PRIVATE PARTS. IF USE THE SOAP THE NIGHT PRIOR TO SURGERY, CHANGE BED LINENS AND NO PETS IN THE BED.     INSTRUCTED NO LOTIONS, JEWELRY, PIERCINGS,  NAIL POLISH, OR DEODORANT DAY OF SURGERY    - IF DIABETIC, CHECK BLOOD GLUCOSE IF LESS THAN 70 OR HAVING SYMPTOMS CALL THE PREOP AREA FOR INSTRUCTIONS ON AM OF SURGERY ( [phone])    -INSTRUCTED TO TAKE THE FOLLOWING MEDICATIONS THE DAY OF SURGERY WITH SIPS OF WATER:   WELLBUTRIN, BUSPAR, LYRICA, TIZANADINE, TRAMADOL AS NEEDED        - DO NOT BRING ANY MEDICATIONS WITH YOU TO THE HOSPITAL THE DAY OF SURGERY, EXCEPT IF USE INHALERS. BRING INHALERS DAY OF SURGERY       - BRING CPAP OR BIPAP TO THE HOSPITAL ONLY IF YOU ARE SPENDING THE NIGHT    - DO NOT SMOKE OR VAPE 24 HOURS PRIOR TO PROCEDURE PER ANESTHESIA REQUEST     -MAKE SURE YOU HAVE A RIDE HOME OR SOMEONE TO STAY WITH YOU THE DAY OF THE PROCEDURE AFTER YOU GO HOME     - FOLLOW ANY OTHER INSTRUCTIONS GIVEN TO YOU BY YOUR SURGEON'S OFFICE.     - DAY OF SURGERY ____________,  COME TO [facility]/ [facility], FIRST FLOOR. CHECK IN AT THE DESK FOR REGISTRATION/SURGERY    - YOU WILL RECEIVE A PHONE CALL THE DAY PRIOR TO SURGERY BETWEEN 1PM AND 4 PM WITH ARRIVAL TIME, IF YOUR SURGERY IS ON A MONDAY YOU WILL RECEIVE A CALL THE FRIDAY PRIOR TO SURGERY DATE    - BRING CASH OR CREDIT CARD FOR COPAYMENT OF MEDICATIONS AFTER SURGERY IF YOU USE THE HOSPITAL PHARMACY ([facility] TO  BED)    - PREADMISSION TESTING NURSE SUSANA BLEVINS [phone] IF HAVE ANY QUESTIONS     -PATIENT PROVIDED THE NUMBER FOR PRE[facility] SURGICAL DEPT IF HAD QUESTIONS AFTER HOURS PRIOR TO SURGERY ([phone]).  INFORMED PT IF NO ANSWER, LEAVE A MESSAGE AND SOMEONE WILL RETURN THEIR CALL       PATIENT VERBALIZED UNDERSTANDING

## 2025-01-22 ENCOUNTER — ANESTHESIA EVENT (OUTPATIENT)
Dept: PERIOP | Facility: HOSPITAL | Age: 50
End: 2025-01-22
Payer: OTHER GOVERNMENT

## 2025-01-22 ENCOUNTER — ANESTHESIA (OUTPATIENT)
Dept: PERIOP | Facility: HOSPITAL | Age: 50
End: 2025-01-22
Payer: OTHER GOVERNMENT

## 2025-01-22 ENCOUNTER — HOSPITAL ENCOUNTER (OUTPATIENT)
Facility: HOSPITAL | Age: 50
Setting detail: HOSPITAL OUTPATIENT SURGERY
Discharge: HOME | End: 2025-01-22
Attending: SURGERY | Admitting: SURGERY
Payer: OTHER GOVERNMENT

## 2025-01-22 VITALS
DIASTOLIC BLOOD PRESSURE: 80 MMHG | WEIGHT: 168.87 LBS | TEMPERATURE: 98.3 F | HEART RATE: 64 BPM | BODY MASS INDEX: 31.08 KG/M2 | SYSTOLIC BLOOD PRESSURE: 129 MMHG | OXYGEN SATURATION: 100 % | RESPIRATION RATE: 18 BRPM | HEIGHT: 62 IN

## 2025-01-22 DIAGNOSIS — K64.1 GRADE II HEMORRHOIDS: ICD-10-CM

## 2025-01-22 PROCEDURE — 46260 REMOVE IN/EX HEM GROUPS 2+: CPT | Performed by: SURGERY

## 2025-01-22 PROCEDURE — 25010000002 LIDOCAINE PF 2% 2 % SOLUTION: Performed by: NURSE ANESTHETIST, CERTIFIED REGISTERED

## 2025-01-22 PROCEDURE — 25010000002 BUPIVACAINE (PF) 0.25 % SOLUTION: Performed by: SURGERY

## 2025-01-22 PROCEDURE — 25010000002 ONDANSETRON PER 1 MG: Performed by: NURSE ANESTHETIST, CERTIFIED REGISTERED

## 2025-01-22 PROCEDURE — 25010000002 FENTANYL CITRATE (PF) 50 MCG/ML SOLUTION: Performed by: NURSE ANESTHETIST, CERTIFIED REGISTERED

## 2025-01-22 PROCEDURE — 25010000002 CEFAZOLIN PER 500 MG: Performed by: SURGERY

## 2025-01-22 PROCEDURE — 25810000003 LACTATED RINGERS PER 1000 ML: Performed by: ANESTHESIOLOGY

## 2025-01-22 PROCEDURE — 25010000002 MIDAZOLAM PER 1MG: Performed by: ANESTHESIOLOGY

## 2025-01-22 PROCEDURE — 88304 TISSUE EXAM BY PATHOLOGIST: CPT | Performed by: SURGERY

## 2025-01-22 PROCEDURE — 25010000002 PROPOFOL 10 MG/ML EMULSION: Performed by: NURSE ANESTHETIST, CERTIFIED REGISTERED

## 2025-01-22 RX ORDER — HYDROCODONE BITARTRATE AND ACETAMINOPHEN 5; 325 MG/1; MG/1
1 TABLET ORAL ONCE AS NEEDED
Status: DISCONTINUED | OUTPATIENT
Start: 2025-01-22 | End: 2025-01-22 | Stop reason: HOSPADM

## 2025-01-22 RX ORDER — ONDANSETRON 4 MG/1
4 TABLET, ORALLY DISINTEGRATING ORAL ONCE AS NEEDED
Status: DISCONTINUED | OUTPATIENT
Start: 2025-01-22 | End: 2025-01-22 | Stop reason: HOSPADM

## 2025-01-22 RX ORDER — ACETAMINOPHEN 500 MG
1000 TABLET ORAL ONCE
Status: COMPLETED | OUTPATIENT
Start: 2025-01-22 | End: 2025-01-22

## 2025-01-22 RX ORDER — LIDOCAINE HYDROCHLORIDE 20 MG/ML
INJECTION, SOLUTION EPIDURAL; INFILTRATION; INTRACAUDAL; PERINEURAL AS NEEDED
Status: DISCONTINUED | OUTPATIENT
Start: 2025-01-22 | End: 2025-01-22 | Stop reason: SURG

## 2025-01-22 RX ORDER — PROMETHAZINE HYDROCHLORIDE 25 MG/1
25 SUPPOSITORY RECTAL ONCE AS NEEDED
Status: DISCONTINUED | OUTPATIENT
Start: 2025-01-22 | End: 2025-01-22 | Stop reason: HOSPADM

## 2025-01-22 RX ORDER — HYDROCODONE BITARTRATE AND ACETAMINOPHEN 5; 325 MG/1; MG/1
1 TABLET ORAL EVERY 6 HOURS PRN
Qty: 12 TABLET | Refills: 0 | Status: SHIPPED | OUTPATIENT
Start: 2025-01-22 | End: 2025-01-23 | Stop reason: SDUPTHER

## 2025-01-22 RX ORDER — ONDANSETRON 2 MG/ML
4 INJECTION INTRAMUSCULAR; INTRAVENOUS EVERY 6 HOURS PRN
Status: DISCONTINUED | OUTPATIENT
Start: 2025-01-22 | End: 2025-01-22 | Stop reason: HOSPADM

## 2025-01-22 RX ORDER — PROMETHAZINE HYDROCHLORIDE 12.5 MG/1
25 TABLET ORAL ONCE AS NEEDED
Status: DISCONTINUED | OUTPATIENT
Start: 2025-01-22 | End: 2025-01-22 | Stop reason: HOSPADM

## 2025-01-22 RX ORDER — OXYCODONE HYDROCHLORIDE 5 MG/1
5 TABLET ORAL
Status: DISCONTINUED | OUTPATIENT
Start: 2025-01-22 | End: 2025-01-22 | Stop reason: HOSPADM

## 2025-01-22 RX ORDER — SODIUM CHLORIDE 0.9 % (FLUSH) 0.9 %
10 SYRINGE (ML) INJECTION EVERY 12 HOURS SCHEDULED
Status: DISCONTINUED | OUTPATIENT
Start: 2025-01-22 | End: 2025-01-22 | Stop reason: HOSPADM

## 2025-01-22 RX ORDER — BUPIVACAINE HYDROCHLORIDE 2.5 MG/ML
INJECTION, SOLUTION EPIDURAL; INFILTRATION; INTRACAUDAL AS NEEDED
Status: DISCONTINUED | OUTPATIENT
Start: 2025-01-22 | End: 2025-01-22 | Stop reason: HOSPADM

## 2025-01-22 RX ORDER — MIDAZOLAM HYDROCHLORIDE 2 MG/2ML
2 INJECTION, SOLUTION INTRAMUSCULAR; INTRAVENOUS ONCE
Status: COMPLETED | OUTPATIENT
Start: 2025-01-22 | End: 2025-01-22

## 2025-01-22 RX ORDER — DEXMEDETOMIDINE HYDROCHLORIDE 100 UG/ML
INJECTION, SOLUTION INTRAVENOUS AS NEEDED
Status: DISCONTINUED | OUTPATIENT
Start: 2025-01-22 | End: 2025-01-22 | Stop reason: SURG

## 2025-01-22 RX ORDER — ONDANSETRON 2 MG/ML
4 INJECTION INTRAMUSCULAR; INTRAVENOUS ONCE AS NEEDED
Status: DISCONTINUED | OUTPATIENT
Start: 2025-01-22 | End: 2025-01-22 | Stop reason: HOSPADM

## 2025-01-22 RX ORDER — FENTANYL CITRATE 50 UG/ML
INJECTION, SOLUTION INTRAMUSCULAR; INTRAVENOUS AS NEEDED
Status: DISCONTINUED | OUTPATIENT
Start: 2025-01-22 | End: 2025-01-22 | Stop reason: SURG

## 2025-01-22 RX ORDER — SODIUM CHLORIDE, SODIUM LACTATE, POTASSIUM CHLORIDE, CALCIUM CHLORIDE 600; 310; 30; 20 MG/100ML; MG/100ML; MG/100ML; MG/100ML
9 INJECTION, SOLUTION INTRAVENOUS CONTINUOUS PRN
Status: DISCONTINUED | OUTPATIENT
Start: 2025-01-22 | End: 2025-01-22 | Stop reason: HOSPADM

## 2025-01-22 RX ORDER — SODIUM CHLORIDE 0.9 % (FLUSH) 0.9 %
10 SYRINGE (ML) INJECTION AS NEEDED
Status: DISCONTINUED | OUTPATIENT
Start: 2025-01-22 | End: 2025-01-22 | Stop reason: HOSPADM

## 2025-01-22 RX ORDER — IBUPROFEN 600 MG/1
600 TABLET, FILM COATED ORAL EVERY 6 HOURS PRN
Status: DISCONTINUED | OUTPATIENT
Start: 2025-01-22 | End: 2025-01-22 | Stop reason: HOSPADM

## 2025-01-22 RX ORDER — SODIUM CHLORIDE 9 MG/ML
40 INJECTION, SOLUTION INTRAVENOUS AS NEEDED
Status: DISCONTINUED | OUTPATIENT
Start: 2025-01-22 | End: 2025-01-22 | Stop reason: HOSPADM

## 2025-01-22 RX ORDER — MEPERIDINE HYDROCHLORIDE 25 MG/ML
12.5 INJECTION INTRAMUSCULAR; INTRAVENOUS; SUBCUTANEOUS
Status: DISCONTINUED | OUTPATIENT
Start: 2025-01-22 | End: 2025-01-22 | Stop reason: HOSPADM

## 2025-01-22 RX ORDER — MAGNESIUM HYDROXIDE 1200 MG/15ML
LIQUID ORAL AS NEEDED
Status: DISCONTINUED | OUTPATIENT
Start: 2025-01-22 | End: 2025-01-22 | Stop reason: HOSPADM

## 2025-01-22 RX ADMIN — ACETAMINOPHEN 1000 MG: 500 TABLET ORAL at 11:52

## 2025-01-22 RX ADMIN — DEXMEDETOMIDINE HYDROCHLORIDE 20 MCG: 100 INJECTION, SOLUTION, CONCENTRATE INTRAVENOUS at 12:35

## 2025-01-22 RX ADMIN — MIDAZOLAM HYDROCHLORIDE 2 MG: 1 INJECTION, SOLUTION INTRAMUSCULAR; INTRAVENOUS at 12:01

## 2025-01-22 RX ADMIN — LIDOCAINE HYDROCHLORIDE 80 MG: 20 INJECTION, SOLUTION EPIDURAL; INFILTRATION; INTRACAUDAL; PERINEURAL at 12:15

## 2025-01-22 RX ADMIN — PROPOFOL 200 MCG/KG/MIN: 10 INJECTION, EMULSION INTRAVENOUS at 12:15

## 2025-01-22 RX ADMIN — SODIUM CHLORIDE 2000 MG: 9 INJECTION, SOLUTION INTRAVENOUS at 12:15

## 2025-01-22 RX ADMIN — FENTANYL CITRATE 25 MCG: 50 INJECTION, SOLUTION INTRAMUSCULAR; INTRAVENOUS at 12:38

## 2025-01-22 RX ADMIN — FENTANYL CITRATE 25 MCG: 50 INJECTION, SOLUTION INTRAMUSCULAR; INTRAVENOUS at 12:30

## 2025-01-22 RX ADMIN — SODIUM CHLORIDE, POTASSIUM CHLORIDE, SODIUM LACTATE AND CALCIUM CHLORIDE 9 ML/HR: 600; 310; 30; 20 INJECTION, SOLUTION INTRAVENOUS at 11:52

## 2025-01-22 RX ADMIN — OXYCODONE HYDROCHLORIDE 5 MG: 5 TABLET ORAL at 13:13

## 2025-01-22 RX ADMIN — ONDANSETRON 4 MG: 2 INJECTION INTRAMUSCULAR; INTRAVENOUS at 13:13

## 2025-01-22 NOTE — OP NOTE
Hemorrhoidectomy procedure Report    Patient Name:  Davina Abarca  YOB: 1975    Date of Surgery:  1/22/2025     Indications: The patient is a 49-year-old female that presented with symptomatic hemorrhoids.  The decision was made to proceed with a rectal exam under anesthesia and a possible hemorrhoid banding versus hemorrhoidectomy.    Pre-op Diagnosis: Hemorrhoids    Post-Op Diagnosis: Same    Procedure/CPT® Codes:    Rectal exam under anesthesia with 2 quadrant hemorrhoidectomy    Staff:  Surgeon(s):  Heber Chand MD    Assistant: Porter Licona    Anesthesia: Monitored Anesthesia Care    Estimated Blood Loss: 10 mL    Implants:    Nothing was implanted during the procedure    Specimen:          Specimens       ID Source Type Tests Collected By Collected At Frozen?    A Hemorrhoid(s) Tissue TISSUE PATHOLOGY EXAM   Heber Chand MD 1/22/25 2159     Description: hemorrhoids    This specimen was not marked as sent.                Findings: Very large hemorrhoids in the left lateral and right posterior lateral hemorrhoid columns    Complications: None    Description of Procedure: The patient was taken to the operating room and placed on the table in supine position.  After ministration of MAC anesthesia, she was placed in lithotomy position and her perianal area was prepped and draped.  She had very large hemorrhoids with quite a bit of hemorrhoid tissue pushed out externally in the left lateral and right posterior lateral hemorrhoid columns.  After examining this it was obvious that we would not be able to adequately treat this with banding.  We made the decision to do a 2 quadrant surgical hemorrhoidectomy.  I started with the left lateral hemorrhoid column first.  The hemorrhoid  was grasped with an Allis clamp.  The anoderm was incised with a 15 blade scalpel and the mucosal incision around this large hemorrhoid was extended up into the anal canal with a LigaSure dissector.  We then  dissected the hemorrhoid up off the sphincter muscle and then divided the base of the hemorrhoid with the LigaSure.  After removing the entire hemorrhoid column from the field including internal and external components I then closed that mucosal defect with a running 2-0 Vicryl suture.  We then excised the right posterior lateral hemorrhoid column.  This hemorrhoid was grasped with an Allis clamp and the anoderm was incised with a 15 blade scalpel.  I extended the mucosal incision around the hemorrhoid column up into the anal canal using a LigaSure dissector.  After dissecting the hemorrhoid up off the sphincter muscle we then divided the base of the hemorrhoid with the LigaSure.  The entire hemorrhoid column including internal and external components were removed.  This mucosal defect was closed with a running 2-0 Vicryl suture.  We had good hemostasis.  I then infiltrated both hemorrhoidectomy sites with quarter percent Marcaine.  Sterile dressings were applied and she was taken the postanesthesia recovery room in stable condition.    Assistant: Porter Licona  was responsible for performing the following activities: Retraction, Suction, and Placing Dressing and their skilled assistance was necessary for the success of this case.    Heber Chand MD     Date: 1/22/2025  Time: 12:48 EST

## 2025-01-22 NOTE — ANESTHESIA POSTPROCEDURE EVALUATION
Patient: Davina Abarca    Procedure Summary       Date: 01/22/25 Room / Location: [facility]  / [facility]    Anesthesia Start: 1210 Anesthesia Stop: 1258    Procedure: Rectal exam under anesthesia with hemorrhoidectomy -treat internal hemorrhoids with application of rubber band (Rectum) Diagnosis:       Grade II hemorrhoids      (Grade II hemorrhoids [K64.1])    Surgeons: Heber Chand MD Provider: Daimen Mercado MD    Anesthesia Type: general ASA Status: 1            Anesthesia Type: general    Vitals  Vitals Value Taken Time   BP 92/76 01/22/25 1335   Temp 36.3 °C (97.3 °F) 01/22/25 1256   Pulse 67 01/22/25 1339   Resp 18 01/22/25 1335   SpO2 100 % 01/22/25 1339   Vitals shown include unfiled device data.        Post Anesthesia Care and Evaluation    Patient location during evaluation: bedside  Patient participation: complete - patient participated  Level of consciousness: awake    Airway patency: patent  PONV Status: none  Cardiovascular status: acceptable  Respiratory status: acceptable  Hydration status: acceptable

## 2025-01-22 NOTE — ANESTHESIA PREPROCEDURE EVALUATION
Anesthesia Evaluation     Patient summary reviewed and Nursing notes reviewed                Airway   Mallampati: I  TM distance: >3 FB  Neck ROM: full  No difficulty expected  Dental      Pulmonary - normal exam    breath sounds clear to auscultation  (+) ,sleep apnea  Cardiovascular - negative cardio ROS and normal exam    Rhythm: regular  Rate: normal        Neuro/Psych  (+) headaches, numbness, psychiatric history  GI/Hepatic/Renal/Endo    (+) obesity    Musculoskeletal     Abdominal    Substance History - negative use     OB/GYN negative ob/gyn ROS         Other   arthritis,                 Anesthesia Plan    ASA 1     general     intravenous induction     Anesthetic plan, risks, benefits, and alternatives have been provided, discussed and informed consent has been obtained with: patient.    CODE STATUS:

## 2025-01-23 ENCOUNTER — TELEPHONE (OUTPATIENT)
Dept: SURGERY | Facility: CLINIC | Age: 50
End: 2025-01-23
Payer: COMMERCIAL

## 2025-01-23 DIAGNOSIS — K64.1 GRADE II HEMORRHOIDS: ICD-10-CM

## 2025-01-23 RX ORDER — HYDROCODONE BITARTRATE AND ACETAMINOPHEN 5; 325 MG/1; MG/1
1 TABLET ORAL EVERY 6 HOURS PRN
Qty: 12 TABLET | Refills: 0 | Status: SHIPPED | OUTPATIENT
Start: 2025-01-23

## 2025-01-23 NOTE — TELEPHONE ENCOUNTER
Rectal exam under anesthesia with 2 quadrant hemorrhoidectomy 01/22/25.    PATIENT CALLED AND SAID THAT SHE THINKS HER ANUS WAS SEWN UP TOO MUCH.  THE HOLE IS VERY SMALL, AND IS MORE ON THE RIGHT SIDE THAN THE LEFT.     SHE HAD TO FART AND GOT SO NAUSEATED FROM THE PAIN, THAT SHE HAD TO FALL OFF THE TOILET, AND THEN IT CAME OUT.    THE PAIN MEDICATION IS NOT HELPING.  SHE IS IN PAIN UNLESS SHE IS LAYING PERFECTLY STILL IN THE SAME POSITION. COLD HELPS.  IF SHE PUTS A TUCKS ON IT, THE COLD HELPS.    SHE WILL TRY TO PUT A PICTURE ON MY CHART LATER TODAY, WHEN SHE CAN GET SOMEONE TO TAKE THEM.      SHE WOULD FEEL BETTER HAVING SOMEONE LOOK AT IT.    #[phone]

## 2025-01-24 LAB
CYTO UR: NORMAL
LAB AP CASE REPORT: NORMAL
LAB AP CLINICAL INFORMATION: NORMAL
PATH REPORT.FINAL DX SPEC: NORMAL
PATH REPORT.GROSS SPEC: NORMAL

## 2025-01-27 ENCOUNTER — TELEPHONE (OUTPATIENT)
Dept: SURGERY | Facility: CLINIC | Age: 50
End: 2025-01-27
Payer: OTHER GOVERNMENT

## 2025-01-27 DIAGNOSIS — Z87.19 S/P HEMORRHOIDECTOMY: ICD-10-CM

## 2025-01-27 DIAGNOSIS — Z98.890 S/P HEMORRHOIDECTOMY: ICD-10-CM

## 2025-01-27 DIAGNOSIS — K64.1 GRADE II HEMORRHOIDS: Primary | ICD-10-CM

## 2025-01-27 RX ORDER — LIDOCAINE 50 MG/G
1 OINTMENT TOPICAL
Qty: 30 G | Refills: 0 | Status: SHIPPED | OUTPATIENT
Start: 2025-01-27

## 2025-01-27 RX ORDER — HYDROCODONE BITARTRATE AND ACETAMINOPHEN 5; 325 MG/1; MG/1
1 TABLET ORAL EVERY 6 HOURS PRN
Qty: 12 TABLET | Refills: 0 | Status: SHIPPED | OUTPATIENT
Start: 2025-01-27 | End: 2025-01-31 | Stop reason: SDUPTHER

## 2025-01-27 NOTE — TELEPHONE ENCOUNTER
I have already call and spoke with this patient. Her main concern is her pain and bleeding after BM's. I have sent in Lidocaine to [facility]. I have also refilled her norco. Have her use the lidocaine every 2 hours as needed. Keep follow-up appt with [facility].

## 2025-01-27 NOTE — TELEPHONE ENCOUNTER
PT HAD A BM THIS MORNING AND PASSED BLOOD CLOTS AND SINCE THEN SHE HAS BEEN PASSING BLOOD CLOTS EVERY 5 MINUTES. SHE GUESSES IT IS ABOUT AN 1/8 OF A CUP OF CLOTS.

## 2025-01-31 DIAGNOSIS — Z87.19 S/P HEMORRHOIDECTOMY: ICD-10-CM

## 2025-01-31 DIAGNOSIS — Z98.890 S/P HEMORRHOIDECTOMY: ICD-10-CM

## 2025-01-31 DIAGNOSIS — K64.1 GRADE II HEMORRHOIDS: ICD-10-CM

## 2025-01-31 RX ORDER — HYDROCODONE BITARTRATE AND ACETAMINOPHEN 5; 325 MG/1; MG/1
1 TABLET ORAL EVERY 6 HOURS PRN
Qty: 10 TABLET | Refills: 0 | Status: SHIPPED | OUTPATIENT
Start: 2025-01-31

## 2025-01-31 RX ORDER — SULFAMETHOXAZOLE AND TRIMETHOPRIM 800; 160 MG/1; MG/1
1 TABLET ORAL 2 TIMES DAILY
Qty: 20 TABLET | Refills: 0 | Status: SHIPPED | OUTPATIENT
Start: 2025-01-31 | End: 2025-02-10

## 2025-02-07 ENCOUNTER — OFFICE VISIT (OUTPATIENT)
Dept: SURGERY | Facility: CLINIC | Age: 50
End: 2025-02-07
Payer: OTHER GOVERNMENT

## 2025-02-07 VITALS — WEIGHT: 162 LBS | RESPIRATION RATE: 16 BRPM | BODY MASS INDEX: 29.81 KG/M2 | HEIGHT: 62 IN

## 2025-02-07 DIAGNOSIS — K64.1 GRADE II HEMORRHOIDS: Primary | ICD-10-CM

## 2025-02-07 PROCEDURE — 99024 POSTOP FOLLOW-UP VISIT: CPT | Performed by: SURGERY

## 2025-02-07 RX ORDER — METRONIDAZOLE 500 MG/1
1 TABLET ORAL 3 TIMES DAILY
COMMUNITY

## 2025-02-07 RX ORDER — CIPROFLOXACIN 500 MG/1
1 TABLET, FILM COATED ORAL EVERY 12 HOURS
COMMUNITY

## 2025-02-07 NOTE — PROGRESS NOTES
Chief Complaint  Post-op (EUA W/ HEMORRHOIDECTOMY)    Subjective          Davina Abarca presents to [facility]  History of Present Illness    Davina Abarca is a 49 y.o. female  who presents today for a postoperative visit.     Patient is here for a follow-up after a 2 quadrant hemorrhoidectomy.  She is doing pretty well.  She is still a bit sore after bowel movements but its gotten a lot better in the last few days.    Past History:  Medical History: has a past medical history of Anesthesia reaction, Anxiety, Diverticulitis of colon (07/2024), Diverticulosis, Endometriosis, Hemorrhoids, IBS (irritable bowel syndrome), Leg pain, Lumbago, Migraines, Rectal bleeding (05/2024), Sleep apnea, and Thoracic back pain.   Surgical History: has a past surgical history that includes Tubal ligation; Tonsillectomy; Laparoscopy; [place] tooth extraction; and Hemorrhoid surgery (N/A, 1/22/2025).   Family History: family history includes Arthritis in her mother; Skin cancer in her father; Stroke in her father.   Social History: reports that she has quit smoking. Her smoking use included cigarettes. She has been exposed to tobacco smoke. She has never used smokeless tobacco. She reports current alcohol use of about 2.0 standard drinks of alcohol per week. She reports that she does not use drugs.  Allergies: Patient has no known allergies.       Current Outpatient Medications:     B Complex Vitamins (VITAMIN B COMPLEX 100 IJ), , Disp: , Rfl:     buPROPion XL (WELLBUTRIN XL) 300 MG 24 hr tablet, Take 1 tablet by mouth Every Morning., Disp: , Rfl:     busPIRone (BUSPAR) 10 MG tablet, Take 1 tablet by mouth 2 (Two) Times a Day., Disp: , Rfl:     ciprofloxacin (CIPRO) 500 MG tablet, Take 1 tablet by mouth Every 12 (Twelve) Hours., Disp: , Rfl:     HYDROcodone-acetaminophen (NORCO) 5-325 MG per tablet, Take 1 tablet by mouth Every 6 (Six) Hours As Needed for Moderate Pain (Pain)., Disp: 12 tablet, Rfl:  "0    HYDROcodone-acetaminophen (Norco) 5-325 MG per tablet, Take 1 tablet by mouth Every 6 (Six) Hours As Needed for Moderate Pain., Disp: 10 tablet, Rfl: 0    hydrocortisone 2.5 % cream, APPLY RECTALLY TO THE AFFECTED AREA TWICE DAILY, Disp: , Rfl:     lidocaine (XYLOCAINE) 5 % ointment, Apply 1 Application topically to the appropriate area as directed Every 2 (Two) Hours As Needed for Mild Pain., Disp: 30 g, Rfl: 0    Melatonin 5 MG capsule, melatonin 5 mg oral capsule take 1 capsule by oral route As needed   Active, Disp: , Rfl:     metroNIDAZOLE (FLAGYL) 500 MG tablet, Take 1 tablet by mouth 3 (Three) Times a Day., Disp: , Rfl:     ondansetron ODT (ZOFRAN-ODT) 4 MG disintegrating tablet, Place 1 tablet on the tongue Every 8 (Eight) Hours As Needed for Nausea or Vomiting., Disp: 14 tablet, Rfl: 0    pregabalin (LYRICA) 100 MG capsule, Take 1 capsule by mouth 3 (Three) Times a Day., Disp: , Rfl:     sulfamethoxazole-trimethoprim (Bactrim DS) 800-160 MG per tablet, Take 1 tablet by mouth 2 (Two) Times a Day for 10 days., Disp: 20 tablet, Rfl: 0    SUMAtriptan (IMITREX) 100 MG tablet, TAKE 1 TABLET BY MOUTH 1 TIME DAILY AS NEEDED, Disp: , Rfl:     tiZANidine (ZANAFLEX) 4 MG tablet, Every 12 (Twelve) Hours., Disp: , Rfl:     traMADol (ULTRAM) 50 MG tablet, Take 1 tablet by mouth Every 8 (Eight) Hours As Needed., Disp: , Rfl:     vitamin B-12 (CYANOCOBALAMIN) 100 MCG tablet, Take 0.5 tablets by mouth Daily., Disp: , Rfl:        Physical Exam  Her hemorrhoidectomy sites have opened up but overall are pretty clean.  Objective     Vital Signs:   Resp 16   Ht 157.5 cm (62\")   Wt 73.5 kg (162 lb)   BMI 29.63 kg/m²              Assessment and Plan    Diagnoses and all orders for this visit:    1. Grade II hemorrhoids (Primary)    We will see her back in 4 weeks to make sure this heals up without incident.      "

## 2025-02-25 ENCOUNTER — TELEPHONE (OUTPATIENT)
Dept: SURGERY | Facility: CLINIC | Age: 50
End: 2025-02-25
Payer: OTHER GOVERNMENT

## 2025-02-25 NOTE — TELEPHONE ENCOUNTER
Pt had hemorrhoidectomy on 01/22 by Dr Chand and had a f/u on 2/7. At that time she was still in the early healing phase and taking regular stool softeners. She would like to be seen, she states that she cannot pass stool unless she doubles up on softeners otherwise it won't pass through. She feel like the top of the rectum needs to be reopened. Voiced that it is hard to explain and she would just like for someone to see her.

## 2025-03-03 ENCOUNTER — OFFICE VISIT (OUTPATIENT)
Dept: SURGERY | Facility: CLINIC | Age: 50
End: 2025-03-03
Payer: OTHER GOVERNMENT

## 2025-03-03 VITALS — WEIGHT: 162 LBS | HEIGHT: 62 IN | BODY MASS INDEX: 29.81 KG/M2 | RESPIRATION RATE: 16 BRPM

## 2025-03-03 DIAGNOSIS — K64.1 GRADE II HEMORRHOIDS: Primary | ICD-10-CM

## 2025-03-03 PROCEDURE — 99024 POSTOP FOLLOW-UP VISIT: CPT | Performed by: SURGERY

## 2025-03-03 NOTE — PROGRESS NOTES
Chief Complaint  Post-op and Hemorrhoids    Subjective          Davina Abarca presents to [facility]  History of Present Illness    Davina Abarca is a 49 y.o. female  who presents today for a postoperative visit.     Patient is here for a follow-up after a 2 quadrant hemorrhoidectomy.  She is doing better now.  She is not having as much discomfort.  She is able to have a fairly large stools.    Past History:  Medical History: has a past medical history of Anesthesia reaction, Anxiety, Diverticulitis of colon (07/2024), Diverticulosis, Endometriosis, Hemorrhoids, IBS (irritable bowel syndrome), Leg pain, Lumbago, Migraines, Rectal bleeding (05/2024), Sleep apnea, and Thoracic back pain.   Surgical History: has a past surgical history that includes Tubal ligation; Tonsillectomy; Laparoscopy; [place] tooth extraction; and Hemorrhoid surgery (N/A, 1/22/2025).   Family History: family history includes Arthritis in her mother; Skin cancer in her father; Stroke in her father.   Social History: reports that she has quit smoking. Her smoking use included cigarettes. She started smoking about 20 years ago. She has a 10 pack-year smoking history. She has been exposed to tobacco smoke. She has never used smokeless tobacco. She reports current alcohol use of about 2.0 standard drinks of alcohol per week. She reports that she does not use drugs.  Allergies: Patient has no known allergies.       Current Outpatient Medications:     buPROPion XL (WELLBUTRIN XL) 300 MG 24 hr tablet, Take 1 tablet by mouth Every Morning., Disp: , Rfl:     busPIRone (BUSPAR) 10 MG tablet, Take 1 tablet by mouth 2 (Two) Times a Day., Disp: , Rfl:     Melatonin 5 MG capsule, melatonin 5 mg oral capsule take 1 capsule by oral route As needed   Active, Disp: , Rfl:     pregabalin (LYRICA) 100 MG capsule, Take 1 capsule by mouth 3 (Three) Times a Day., Disp: , Rfl:     SUMAtriptan (IMITREX) 100 MG tablet, TAKE 1 TABLET  "BY MOUTH 1 TIME DAILY AS NEEDED, Disp: , Rfl:     tiZANidine (ZANAFLEX) 4 MG tablet, Every 12 (Twelve) Hours., Disp: , Rfl:     traMADol (ULTRAM) 50 MG tablet, Take 1 tablet by mouth Every 8 (Eight) Hours As Needed., Disp: , Rfl:     vitamin B-12 (CYANOCOBALAMIN) 100 MCG tablet, Take 0.5 tablets by mouth Daily., Disp: , Rfl:        Physical Exam  Her perianal area looks good and appears to have completely healed.  Objective     Vital Signs:   Resp 16   Ht 157.5 cm (62.01\")   Wt 73.5 kg (162 lb)   BMI 29.62 kg/m²              Assessment and Plan    Diagnoses and all orders for this visit:    1. Grade II hemorrhoids (Primary)    I will see her back on an as-needed basis.  Have asked her to call me if she were to have any further questions or concerns.      "

## 2025-03-25 ENCOUNTER — OFFICE VISIT (OUTPATIENT)
Dept: ORTHOPEDIC SURGERY | Facility: CLINIC | Age: 50
End: 2025-03-25
Payer: OTHER GOVERNMENT

## 2025-03-25 VITALS
HEART RATE: 54 BPM | DIASTOLIC BLOOD PRESSURE: 91 MMHG | WEIGHT: 162 LBS | SYSTOLIC BLOOD PRESSURE: 144 MMHG | OXYGEN SATURATION: 98 % | BODY MASS INDEX: 29.81 KG/M2 | HEIGHT: 62 IN

## 2025-03-25 DIAGNOSIS — M77.11 RIGHT LATERAL EPICONDYLITIS: Primary | ICD-10-CM

## 2025-03-25 DIAGNOSIS — M77.8 RIGHT SHOULDER TENDONITIS: ICD-10-CM

## 2025-03-25 RX ADMIN — LIDOCAINE HYDROCHLORIDE 5 ML: 10 INJECTION, SOLUTION INFILTRATION; PERINEURAL at 14:49

## 2025-03-25 RX ADMIN — LIDOCAINE HYDROCHLORIDE 1 ML: 10 INJECTION, SOLUTION INFILTRATION; PERINEURAL at 14:49

## 2025-03-25 RX ADMIN — TRIAMCINOLONE ACETONIDE 40 MG: 40 INJECTION, SUSPENSION INTRA-ARTICULAR; INTRAMUSCULAR at 14:49

## 2025-03-25 NOTE — PROGRESS NOTES
Chief Complaint  Initial Evaluation of the Right Shoulder and Initial Evaluation of the Right Elbow       Subjective      Davina Abarca presents to [facility] for an evaluation  of her right elbow and right elbow. She has had pain to her right shoulder and right elbow for several months. She has pain with lifting and gripping. She locates her pain to the lateral  elbow and anterior  shoulder. She denies any specific injury, trauma, falls or prior surgery to her elbow or shoulder. She recently went to her family doctor where she had x-rays done on her right wrist and right shoulder.     No Known Allergies     Social History     Socioeconomic History    Marital status:     Number of children: 3   Tobacco Use    Smoking status: Former     Current packs/day: 0.50     Average packs/day: 0.5 packs/day for 20.1 years (10.0 ttl pk-yrs)     Types: Cigarettes     Start date: 2/26/2005     Passive exposure: Current    Smokeless tobacco: Never    Tobacco comments:     Vaped this am    Vaping Use    Vaping status: Every Day    Substances: Nicotine, Flavoring   Substance and Sexual Activity    Alcohol use: Yes     Alcohol/week: 2.0 standard drinks of alcohol     Types: 2 Drinks containing 0.5 oz of alcohol per week     Comment: current some day occasionally drinks, less than 1 drink perday has been drinking for 6-10 years    Drug use: Never    Sexual activity: Yes     Partners: Male     Birth control/protection: Tubal ligation, Partner of same sex        I reviewed the patient's chief complaint, history of present illness, review of systems, past medical history, surgical history, family history, social history, medications, and allergy list.     Review of Systems     Constitutional: Denies fevers, chills, weight loss  Cardiovascular: Denies chest pain, shortness of breath  Skin: Denies rashes, acute skin changes  Neurologic: Denies headache, loss of consciousness  MSK: right shoulder and  "right elbow       Vital Signs:   /91   Pulse 54   Ht 157.5 cm (62.01\")   Wt 73.5 kg (162 lb)   SpO2 98%   BMI 29.62 kg/m²            Ortho Exam    Physical Exam  General:Alert. No acute distress     Right upper extremity:  Tender to the lateral  epicondyle, full elbow extension, flexion  to 155 degrees, pain with resisted wrist and long finger extension, tender to the anterior  shoulder, forward elevation  to 175 degrees, abduction to 160 degrees, external rotation  to 90 degrees, internal rotation to 70 degrees, 4 plus supraspinatus strength, 5/5 infraspinatus and subscap, internal rotation to L1, negative  lift off, negative  impingement, negative  cross arm abduction, neurovascularly intact, positive  pulses, sensation intact to the medial, radial and ulnar nerve     Right shoulder injection  Date/Time: 3/25/2025 2:49 PM  Consent given by: patient  Site marked: site marked  Timeout: Immediately prior to procedure a time out was called to verify the correct patient, procedure, equipment, support staff and site/side marked as required   Supporting Documentation  Indications: pain   Procedure Details  Location: shoulder -   Needle gauge: 21g.  Medications administered: 5 mL lidocaine 1 %; 40 mg triamcinolone acetonide 40 MG/ML      Right elbow injection: R elbow  Date/Time: 3/25/2025 2:49 PM  Consent given by: patient  Site marked: site marked  Timeout: Immediately prior to procedure a time out was called to verify the correct patient, procedure, equipment, support staff and site/side marked as required   Supporting Documentation  Indications: pain   Procedure Details  Location: elbow - R elbow  Needle size: 23 G  Medications administered: 1 mL lidocaine 1 %; 40 mg triamcinolone acetonide 40 MG/ML  Patient tolerance: patient tolerated the procedure well with no immediate complications    This injection documentation was Scribed for Sergio Mcdaniel MD by Daysi Lang MA.  03/25/25   14:50 " EDT    Imaging Results (Most Recent)       None             Result Review :       XR Elbow 3+ View Right  Result Date: 2/26/2025  Narrative: XR ELBOW 3+ VW RIGHT Date of Exam: 2/26/2025 2:55 PM EST Indication: right elbow pain x 2 weeks; nki Comparison: None available. Findings: There is no radiographic evidence of acute fracture or dislocation. Joint spaces are preserved. No focal soft tissue abnormalities identified.     Impression: Impression: No radiographic evidence of acute fracture or dislocation. Electronically Signed: Morales Giron MD  2/26/2025 3:20 PM EST  Workstation ID: [id number]    XR Shoulder 2+ View Right  Result Date: 2/26/2025  Narrative: XR SHOULDER 2+ VW RIGHT Date of Exam: 2/26/2025 2:55 PM EST Indication: right shoulder pain x 2 weeks; nki Comparison: None available. Findings: No fractures are visualized. There are no findings of dislocation. Mild degenerative spurring is seen in the glenohumeral joint. Mild AC arthrosis is evident. No lytic or sclerotic bone lesions are seen. No abnormality is seen in the included portions of the right hemithorax.     Impression: Impression: Right shoulder series demonstrating mild degenerative change consistent with osteoarthritis. Electronically Signed: Atilio Good MD  2/26/2025 3:20 PM EST  Workstation ID: [id number]    XR Spine Cervical 3 View  Result Date: 2/26/2025  Narrative: XR SPINE CERVICAL 3 VW Date of Exam: 2/26/2025 2:55 PM EST Indication: not able to  items with right hand, recent epidural injection to cervical spine Comparison: 10/10/2023. Findings: Vertebral body heights are maintained without evidence of acute fracture. There is straightening of the usual cervical lordosis, otherwise without listhesis or subluxation. Multilevel spondylosis change is present, with areas of disc osteophyte complex formation and facet arthropathy, most notable at C5-6 and C6-7. The prevertebral soft tissues are normal. The dens is intact.     Impression:  Impression: Vertebral body heights are maintained without evidence of acute fracture. There is straightening of the usual cervical lordosis, otherwise without listhesis or subluxation. Multilevel spondylosis change is present, with areas of disc osteophyte complex formation and facet arthropathy, most notable at C5-6 and C6-7. The prevertebral soft tissues are normal. The dens is intact. Electronically Signed: Redd Knight MD  2/26/2025 3:19 PM EST  Workstation ID: [id number]             Assessment and Plan     Diagnoses and all orders for this visit:    1. Right lateral epicondylitis (Primary)    2. Right shoulder tendonitis        The patient presents here today for an evaluation  of her right elbow and right shoulder.     Discussed the risks and benefits of a right shoulder and right elbow steroid injection today in the office. Patient expressed understanding and wishes to proceed. Patient tolerted the injection well and without any complications.     Home exercises given today and will continue current medications for pain control.     May consider an MRI in the future on her elbow and shoulder if the symptoms persist.     Call or return if worsening symptoms.    Follow Up     6 - 8 weeks     Patient was given instructions and counseling regarding her condition or for health maintenance advice. Please see specific information pulled into the AVS if appropriate.     Scribed for Sergio Mcdaniel MD by Zuly Harmon.  03/25/25   14:31 EDT    I have personally performed the services described in this document as scribed by the above individual and it is both accurate and complete. Sergio Mcadniel MD 03/26/25

## 2025-03-26 RX ORDER — TRIAMCINOLONE ACETONIDE 40 MG/ML
40 INJECTION, SUSPENSION INTRA-ARTICULAR; INTRAMUSCULAR
Status: COMPLETED | OUTPATIENT
Start: 2025-03-25 | End: 2025-03-25

## 2025-03-26 RX ORDER — LIDOCAINE HYDROCHLORIDE 10 MG/ML
1 INJECTION, SOLUTION INFILTRATION; PERINEURAL
Status: COMPLETED | OUTPATIENT
Start: 2025-03-25 | End: 2025-03-25

## 2025-03-26 RX ORDER — LIDOCAINE HYDROCHLORIDE 10 MG/ML
5 INJECTION, SOLUTION INFILTRATION; PERINEURAL
Status: COMPLETED | OUTPATIENT
Start: 2025-03-25 | End: 2025-03-25

## 2025-05-01 ENCOUNTER — HOSPITAL ENCOUNTER (EMERGENCY)
Facility: HOSPITAL | Age: 50
Discharge: HOME | End: 2025-05-01
Attending: EMERGENCY MEDICINE
Payer: COMMERCIAL

## 2025-05-01 ENCOUNTER — APPOINTMENT (OUTPATIENT)
Dept: GENERAL RADIOLOGY | Facility: HOSPITAL | Age: 50
End: 2025-05-01
Payer: COMMERCIAL

## 2025-05-01 VITALS
DIASTOLIC BLOOD PRESSURE: 99 MMHG | WEIGHT: 156.75 LBS | SYSTOLIC BLOOD PRESSURE: 128 MMHG | RESPIRATION RATE: 16 BRPM | TEMPERATURE: 98.3 F | HEIGHT: 62 IN | OXYGEN SATURATION: 99 % | HEART RATE: 60 BPM | BODY MASS INDEX: 28.84 KG/M2
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
## 2025-05-01 DIAGNOSIS — M54.6 THORACIC BACK PAIN, UNSPECIFIED BACK PAIN LATERALITY, UNSPECIFIED CHRONICITY: ICD-10-CM

## 2025-05-01 PROCEDURE — 25010000002 DEXAMETHASONE SODIUM PHOSPHATE 10 MG/ML SOLUTION

## 2025-05-01 PROCEDURE — 72100 X-RAY EXAM L-S SPINE 2/3 VWS: CPT

## 2025-05-01 PROCEDURE — 96375 TX/PRO/DX INJ NEW DRUG ADDON: CPT

## 2025-05-01 PROCEDURE — 72072 X-RAY EXAM THORAC SPINE 3VWS: CPT

## 2025-05-01 PROCEDURE — 99283 EMERGENCY DEPT VISIT LOW MDM: CPT

## 2025-05-01 PROCEDURE — 96374 THER/PROPH/DIAG INJ IV PUSH: CPT

## 2025-05-01 PROCEDURE — 25010000002 KETOROLAC TROMETHAMINE PER 15 MG

## 2025-05-01 RX ORDER — KETOROLAC TROMETHAMINE 15 MG/ML
15 INJECTION, SOLUTION INTRAMUSCULAR; INTRAVENOUS ONCE
Status: COMPLETED | OUTPATIENT
Start: 2025-05-01 | End: 2025-05-01

## 2025-05-01 RX ORDER — KETOROLAC TROMETHAMINE 10 MG/1
10 TABLET, FILM COATED ORAL EVERY 6 HOURS PRN
Qty: 15 TABLET | Refills: 0 | Status: SHIPPED | OUTPATIENT
Start: 2025-05-01

## 2025-05-01 RX ORDER — DEXAMETHASONE SODIUM PHOSPHATE 10 MG/ML
10 INJECTION, SOLUTION INTRAMUSCULAR; INTRAVENOUS ONCE
Status: COMPLETED | OUTPATIENT
Start: 2025-05-01 | End: 2025-05-01

## 2025-05-01 RX ADMIN — DEXAMETHASONE SODIUM PHOSPHATE 10 MG: 10 INJECTION INTRAMUSCULAR; INTRAVENOUS at 23:06

## 2025-05-01 RX ADMIN — KETOROLAC TROMETHAMINE 15 MG: 15 INJECTION, SOLUTION INTRAMUSCULAR; INTRAVENOUS at 23:06
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
## 2025-05-06 PROBLEM — M77.8 RIGHT SHOULDER TENDONITIS: Status: ACTIVE | Noted: 2025-05-06

## 2025-05-06 PROBLEM — M77.11 RIGHT LATERAL EPICONDYLITIS: Status: ACTIVE | Noted: 2025-05-06

## 2025-05-12 ENCOUNTER — OFFICE VISIT (OUTPATIENT)
Dept: ORTHOPEDIC SURGERY | Facility: CLINIC | Age: 50
End: 2025-05-12
Payer: OTHER GOVERNMENT

## 2025-05-12 VITALS
WEIGHT: 156 LBS | OXYGEN SATURATION: 98 % | DIASTOLIC BLOOD PRESSURE: 79 MMHG | HEART RATE: 72 BPM | HEIGHT: 62 IN | BODY MASS INDEX: 28.71 KG/M2 | SYSTOLIC BLOOD PRESSURE: 118 MMHG

## 2025-05-12 DIAGNOSIS — M77.8 RIGHT SHOULDER TENDONITIS: ICD-10-CM

## 2025-05-12 DIAGNOSIS — M77.11 RIGHT LATERAL EPICONDYLITIS: Primary | ICD-10-CM

## 2025-05-12 PROCEDURE — 99213 OFFICE O/P EST LOW 20 MIN: CPT | Performed by: PHYSICIAN ASSISTANT

## 2025-05-12 NOTE — PROGRESS NOTES
Chief Complaint  Follow-up of the Right Shoulder and Follow-up of the Right Elbow    Subjective          History of Present Illness      Davina Abarca is a 49 y.o. female  presents to [facility] for     Patient presents for follow-up evaluation of right elbow lateral epicondylitis and right shoulder tendinitis/bursitis.  She saw Dr. Mcdaniel for her original evaluation on 3/25/2025.  She she received a right shoulder right elbow steroid injection she states the right shoulder had excellent results with the injection and she states the pain she had prior to the injection resolved and she has no concerns for the shoulder at this time.  She states the right elbow also got better with her injection but she states it has been wearing off lately where the pain has come back with certain activities.  Overall she is happy with her results.      No Known Allergies     Social History     Socioeconomic History    Marital status:     Number of children: 3   Tobacco Use    Smoking status: Former     Current packs/day: 0.50     Average packs/day: 0.5 packs/day for 20.2 years (10.1 ttl pk-yrs)     Types: Cigarettes     Start date: 2/26/2005     Passive exposure: Current    Smokeless tobacco: Never    Tobacco comments:     Vaped this am    Vaping Use    Vaping status: Every Day    Substances: Nicotine, Flavoring   Substance and Sexual Activity    Alcohol use: Yes     Alcohol/week: 2.0 standard drinks of alcohol     Types: 2 Drinks containing 0.5 oz of alcohol per week     Comment: current some day occasionally drinks, less than 1 drink perday has been drinking for 6-10 years    Drug use: Never    Sexual activity: Yes     Partners: Male     Birth control/protection: Tubal ligation, Partner of same sex        REVIEW OF SYSTEMS    Constitutional: Awake alert and oriented x3, no acute distress, denies fevers, chills, weight loss  Respiratory: No respiratory distress  Vascular: Brisk cap refill,  "Intact distal pulses, No cyanosis, compartments soft with no signs or symptoms of compartment syndrome or DVT.   Cardiovascular: Denies chest pain, shortness of breath  Skin: Denies rashes, acute skin changes  Neurologic: Denies headache, loss of consciousness  MSK: Right shoulder pain, right elbow pain      Objective   Vital Signs:   /79   Pulse 72   Ht 157.5 cm (62.01\")   Wt 70.8 kg (156 lb)   SpO2 98%   BMI 28.53 kg/m²     Body mass index is 28.53 kg/m².    Physical Exam       Right shoulder: Nontender to palpation no pain with range of motion 5 out of 5 strength no pain with resisted range of motion, active forward elevation 180 active abduction 140 external rotation with abduction 90 internal rotation to T 10.  Neurovascularly intact  Right elbow: Nontender to palpation medial lateral anterior posterior elbow, full range of motion with flexion extension supination and pronation no pain with resisted wrist extension or third finger extension, 5 out of 5 strength, neurovascular intact    Procedures    Imaging Results (Most Recent)       None                   Assessment and Plan    Diagnoses and all orders for this visit:    1. Right lateral epicondylitis (Primary)    2. Right shoulder tendonitis        Discussed diagnosis and treatment options with the patient she was advised we can treat her with future injections for the shoulder and elbow, we also discussed MRI for shoulder or elbow as an option at a time she would like to follow-up as needed    Call or return if worsening symptoms.    Follow Up   Return if symptoms worsen or fail to improve.  Patient was given instructions and counseling regarding her condition or for health maintenance advice. Please see specific information pulled into the AVS if appropriate.       EMR Dragon/Transcription disclaimer:  Part of this note may be an electronic transcription/translation of spoken language to printed text using the Dragon Dictation System  "

## 2025-05-15 ENCOUNTER — OFFICE VISIT (OUTPATIENT)
Dept: ORTHOPEDIC SURGERY | Facility: CLINIC | Age: 50
End: 2025-05-15
Payer: COMMERCIAL

## 2025-05-15 VITALS — HEIGHT: 62 IN | BODY MASS INDEX: 28.71 KG/M2 | WEIGHT: 156 LBS

## 2025-05-15 DIAGNOSIS — M77.8 LEFT SHOULDER TENDONITIS: ICD-10-CM

## 2025-05-15 DIAGNOSIS — R20.2 PARESTHESIA: ICD-10-CM

## 2025-05-15 DIAGNOSIS — M25.512 LEFT SHOULDER PAIN, UNSPECIFIED CHRONICITY: Primary | ICD-10-CM

## 2025-05-15 RX ORDER — LIDOCAINE HYDROCHLORIDE 10 MG/ML
5 INJECTION, SOLUTION INFILTRATION; PERINEURAL
Status: COMPLETED | OUTPATIENT
Start: 2025-05-15 | End: 2025-05-15

## 2025-05-15 RX ORDER — TRIAMCINOLONE ACETONIDE 40 MG/ML
40 INJECTION, SUSPENSION INTRA-ARTICULAR; INTRAMUSCULAR
Status: COMPLETED | OUTPATIENT
Start: 2025-05-15 | End: 2025-05-15

## 2025-05-15 RX ADMIN — TRIAMCINOLONE ACETONIDE 40 MG: 40 INJECTION, SUSPENSION INTRA-ARTICULAR; INTRAMUSCULAR at 12:51

## 2025-05-15 RX ADMIN — LIDOCAINE HYDROCHLORIDE 5 ML: 10 INJECTION, SOLUTION INFILTRATION; PERINEURAL at 12:51
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
## 2025-05-30 ENCOUNTER — HOSPITAL ENCOUNTER (OUTPATIENT)
Facility: HOSPITAL | Age: 50
Discharge: HOME | End: 2025-05-30
Payer: OTHER GOVERNMENT

## 2025-05-30 DIAGNOSIS — M77.8 LEFT SHOULDER TENDONITIS: ICD-10-CM

## 2025-05-30 DIAGNOSIS — R20.2 PARESTHESIA: ICD-10-CM

## 2025-05-30 DIAGNOSIS — M25.512 LEFT SHOULDER PAIN, UNSPECIFIED CHRONICITY: ICD-10-CM

## 2025-05-30 PROCEDURE — 95913 NRV CNDJ TEST 13/> STUDIES: CPT

## 2025-05-30 PROCEDURE — 95886 MUSC TEST DONE W/N TEST COMP: CPT

## 2025-05-30 NOTE — THERAPY EVALUATION
Physical Therapy Evaluation    SUBJECTIVE  Please see EMG report for details    OBJECTIVE  Please see EMG report for details.    ASSESSMENT  This was an abnormal study.     Please see EMG report for details.    PLAN  LTG 1:  Today:  Complete EMG / NCV study as appropriate and provide full report to the referring provider.   Treatment:  None    Frequency/Duration:  Evaluation only and discharge today.    Pt/responsible party agrees with plan of care and has been informed of all alternatives, risks and benefits.

## 2025-06-02 ENCOUNTER — TELEPHONE (OUTPATIENT)
Dept: ORTHOPEDIC SURGERY | Facility: CLINIC | Age: 50
End: 2025-06-02
Payer: COMMERCIAL

## (undated) DEVICE — DRAPE UNDERBUTTOCKS W FLUID POUCH 40X44IN

## (undated) DEVICE — LEGGINGS, PAIR, 31X48, STERILE: Brand: MEDLINE

## (undated) DEVICE — PENCL SMOKE/EVAC MEGADYNE TELESCP 10FT

## (undated) DEVICE — DEV OPN LIGASURE SM/JAW 28D 16.5MM 18.8CM 1P/U

## (undated) DEVICE — GLV SURG SENSICARE PI ORTHO SZ8 LF STRL

## (undated) DEVICE — STERILE POLYISOPRENE POWDER-FREE SURGICAL GLOVES WITH EMOLLIENT COATING: Brand: PROTEXIS

## (undated) DEVICE — STRAP STIRUP SLP RNG 19X3.5IN DISP

## (undated) DEVICE — MINOR-LF: Brand: MEDLINE INDUSTRIES, INC.

## (undated) DEVICE — SKIN PREP TRAY W/CHG: Brand: MEDLINE INDUSTRIES, INC.

## (undated) DEVICE — GOWN,REINFRCE,POLY,SIRUS,BREATH SLV,XXLG: Brand: MEDLINE

## (undated) DEVICE — SOL IRR NACL 0.9PCT BO 1000ML